# Patient Record
Sex: FEMALE | Race: WHITE | Employment: OTHER | URBAN - METROPOLITAN AREA
[De-identification: names, ages, dates, MRNs, and addresses within clinical notes are randomized per-mention and may not be internally consistent; named-entity substitution may affect disease eponyms.]

---

## 2017-12-03 ENCOUNTER — HOSPITAL ENCOUNTER (EMERGENCY)
Age: 82
Discharge: HOME | End: 2017-12-03
Attending: EMERGENCY MEDICINE
Payer: MEDICARE

## 2017-12-03 VITALS
SYSTOLIC BLOOD PRESSURE: 113 MMHG | WEIGHT: 102 LBS | BODY MASS INDEX: 20.03 KG/M2 | HEART RATE: 75 BPM | HEIGHT: 60 IN | RESPIRATION RATE: 19 BRPM | DIASTOLIC BLOOD PRESSURE: 73 MMHG | TEMPERATURE: 97.5 F | OXYGEN SATURATION: 95 %

## 2017-12-03 DIAGNOSIS — R11.0 NAUSEA WITHOUT VOMITING: ICD-10-CM

## 2017-12-03 DIAGNOSIS — R55 SYNCOPE AND COLLAPSE: Primary | ICD-10-CM

## 2017-12-03 LAB
ALBUMIN SERPL-MCNC: 3.1 G/DL (ref 3.5–5)
ALBUMIN/GLOB SERPL: 0.7 {RATIO} (ref 1.1–2.2)
ALP SERPL-CCNC: 129 U/L (ref 45–117)
ALT SERPL-CCNC: 33 U/L (ref 12–78)
ANION GAP SERPL CALC-SCNC: 9 MMOL/L (ref 5–15)
AST SERPL-CCNC: 41 U/L (ref 15–37)
ATRIAL RATE: 52 BPM
BASOPHILS # BLD: 0 K/UL (ref 0–0.1)
BASOPHILS NFR BLD: 0 % (ref 0–1)
BILIRUB SERPL-MCNC: 0.7 MG/DL (ref 0.2–1)
BUN SERPL-MCNC: 19 MG/DL (ref 6–20)
BUN/CREAT SERPL: 17 (ref 12–20)
CALCIUM SERPL-MCNC: 9.1 MG/DL (ref 8.5–10.1)
CALCULATED R AXIS, ECG10: -82 DEGREES
CALCULATED T AXIS, ECG11: 98 DEGREES
CHLORIDE SERPL-SCNC: 103 MMOL/L (ref 97–108)
CO2 SERPL-SCNC: 26 MMOL/L (ref 21–32)
CREAT SERPL-MCNC: 1.1 MG/DL (ref 0.55–1.02)
DIAGNOSIS, 93000: NORMAL
EOSINOPHIL # BLD: 0.2 K/UL (ref 0–0.4)
EOSINOPHIL NFR BLD: 2 % (ref 0–7)
ERYTHROCYTE [DISTWIDTH] IN BLOOD BY AUTOMATED COUNT: 13.2 % (ref 11.5–14.5)
GLOBULIN SER CALC-MCNC: 4.4 G/DL (ref 2–4)
GLUCOSE SERPL-MCNC: 132 MG/DL (ref 65–100)
HCT VFR BLD AUTO: 41.8 % (ref 35–47)
HGB BLD-MCNC: 13.5 G/DL (ref 11.5–16)
LYMPHOCYTES # BLD: 1.9 K/UL (ref 0.8–3.5)
LYMPHOCYTES NFR BLD: 18 % (ref 12–49)
MAGNESIUM SERPL-MCNC: 2.1 MG/DL (ref 1.6–2.4)
MCH RBC QN AUTO: 29.2 PG (ref 26–34)
MCHC RBC AUTO-ENTMCNC: 32.3 G/DL (ref 30–36.5)
MCV RBC AUTO: 90.3 FL (ref 80–99)
MONOCYTES # BLD: 1.2 K/UL (ref 0–1)
MONOCYTES NFR BLD: 11 % (ref 5–13)
NEUTS SEG # BLD: 7.4 K/UL (ref 1.8–8)
NEUTS SEG NFR BLD: 69 % (ref 32–75)
PLATELET # BLD AUTO: 228 K/UL (ref 150–400)
POTASSIUM SERPL-SCNC: 3.9 MMOL/L (ref 3.5–5.1)
PROT SERPL-MCNC: 7.5 G/DL (ref 6.4–8.2)
Q-T INTERVAL, ECG07: 498 MS
QRS DURATION, ECG06: 154 MS
QTC CALCULATION (BEZET), ECG08: 529 MS
RBC # BLD AUTO: 4.63 M/UL (ref 3.8–5.2)
SODIUM SERPL-SCNC: 138 MMOL/L (ref 136–145)
TROPONIN I SERPL-MCNC: <0.04 NG/ML
VENTRICULAR RATE, ECG03: 68 BPM
WBC # BLD AUTO: 10.7 K/UL (ref 3.6–11)

## 2017-12-03 PROCEDURE — 80053 COMPREHEN METABOLIC PANEL: CPT | Performed by: EMERGENCY MEDICINE

## 2017-12-03 PROCEDURE — 85025 COMPLETE CBC W/AUTO DIFF WBC: CPT | Performed by: EMERGENCY MEDICINE

## 2017-12-03 PROCEDURE — 96374 THER/PROPH/DIAG INJ IV PUSH: CPT

## 2017-12-03 PROCEDURE — 99285 EMERGENCY DEPT VISIT HI MDM: CPT

## 2017-12-03 PROCEDURE — 36415 COLL VENOUS BLD VENIPUNCTURE: CPT | Performed by: EMERGENCY MEDICINE

## 2017-12-03 PROCEDURE — 84484 ASSAY OF TROPONIN QUANT: CPT | Performed by: EMERGENCY MEDICINE

## 2017-12-03 PROCEDURE — 74011250636 HC RX REV CODE- 250/636: Performed by: EMERGENCY MEDICINE

## 2017-12-03 PROCEDURE — 93005 ELECTROCARDIOGRAM TRACING: CPT

## 2017-12-03 PROCEDURE — 83735 ASSAY OF MAGNESIUM: CPT | Performed by: EMERGENCY MEDICINE

## 2017-12-03 PROCEDURE — 74011250637 HC RX REV CODE- 250/637: Performed by: EMERGENCY MEDICINE

## 2017-12-03 RX ORDER — LOPERAMIDE HYDROCHLORIDE 2 MG/1
2 CAPSULE ORAL ONCE
Status: COMPLETED | OUTPATIENT
Start: 2017-12-03 | End: 2017-12-03

## 2017-12-03 RX ORDER — ONDANSETRON 2 MG/ML
4 INJECTION INTRAMUSCULAR; INTRAVENOUS
Status: COMPLETED | OUTPATIENT
Start: 2017-12-03 | End: 2017-12-03

## 2017-12-03 RX ADMIN — ONDANSETRON 4 MG: 2 INJECTION INTRAMUSCULAR; INTRAVENOUS at 11:39

## 2017-12-03 RX ADMIN — LOPERAMIDE HYDROCHLORIDE 2 MG: 2 CAPSULE ORAL at 13:10

## 2017-12-03 NOTE — ED NOTES
Patient IV removed and dressing applied. Discharge instructions provided and teaching completed. Family expresses understanding. Patient given clean clothing to go home in.

## 2017-12-03 NOTE — DISCHARGE INSTRUCTIONS
Fainting: Care Instructions  Your Care Instructions    When you faint, or pass out, you lose consciousness for a short time. A brief drop in blood flow to the brain often causes it. When you fall or lie down, more blood flows to your brain and you regain consciousness. Emotional stress, pain, or overheating-especially if you have been standing-can make you faint. In these cases, fainting is usually not serious. But fainting can be a sign of a more serious problem. Your doctor may want you to have more tests to rule out other causes. The treatment you need depends on the reason why you fainted. The doctor has checked you carefully, but problems can develop later. If you notice any problems or new symptoms, get medical treatment right away. Follow-up care is a key part of your treatment and safety. Be sure to make and go to all appointments, and call your doctor if you are having problems. It's also a good idea to know your test results and keep a list of the medicines you take. How can you care for yourself at home? · Drink plenty of fluids to prevent dehydration. If you have kidney, heart, or liver disease and have to limit fluids, talk with your doctor before you increase your fluid intake. When should you call for help? Call 911 anytime you think you may need emergency care. For example, call if:  ? · You have symptoms of a heart problem. These may include:  ¨ Chest pain or pressure. ¨ Severe trouble breathing. ¨ A fast or irregular heartbeat. ¨ Lightheadedness or sudden weakness. ¨ Coughing up pink, foamy mucus. ¨ Passing out. After you call 911, the  may tell you to chew 1 adult-strength or 2 to 4 low-dose aspirin. Wait for an ambulance. Do not try to drive yourself. ? · You have symptoms of a stroke. These may include:  ¨ Sudden numbness, tingling, weakness, or loss of movement in your face, arm, or leg, especially on only one side of your body. ¨ Sudden vision changes.   ¨ Sudden trouble speaking. ¨ Sudden confusion or trouble understanding simple statements. ¨ Sudden problems with walking or balance. ¨ A sudden, severe headache that is different from past headaches. ? · You passed out (lost consciousness) again. ? Watch closely for changes in your health, and be sure to contact your doctor if:  ? · You do not get better as expected. Where can you learn more? Go to http://kush-charlette.info/. Enter Z004 in the search box to learn more about \"Fainting: Care Instructions. \"  Current as of: March 20, 2017  Content Version: 11.4  © 1819-7174 [employer]. Care instructions adapted under license by [employer] (which disclaims liability or warranty for this information). If you have questions about a medical condition or this instruction, always ask your healthcare professional. [address] any warranty or liability for your use of this information. We hope that we have addressed all of your medical concerns. The examination and treatment you received in the Emergency Department were for an emergent problem and were not intended as complete care. It is important that you follow up with your healthcare provider(s) for ongoing care. If your symptoms worsen or do not improve as expected, and you are unable to reach your usual health care provider(s), you should return to the Emergency Department. Today's healthcare is undergoing tremendous change, and patient satisfaction surveys are one of the many tools to assess the quality of medical care. You may receive a survey from the [employer] organization regarding your experience in the Emergency Department. I hope that your experience has been completely positive, particularly the medical care that I provided. As such, please participate in the survey; anything less than excellent does not meet my expectations or intentions.         [facility] and Mariaelena Arreola participate in nationally recognized quality of care measures. If your blood pressure is greater than 120/80, as reported below, we urge that you seek medical care to address the potential of high blood pressure, commonly known as hypertension. Hypertension can be hereditary or can be caused by certain medical conditions, pain, stress, or \"white coat syndrome. \"       Please make an appointment with your health care provider(s) for follow up of your Emergency Department visit. VITALS:   Patient Vitals for the past 8 hrs:   Temp Pulse Resp BP SpO2   12/03/17 1215 - 67 17 121/64 93 %   12/03/17 1200 - 67 17 115/57 94 %   12/03/17 1145 - 67 20 131/60 94 %   12/03/17 1130 - 67 13 126/64 94 %   12/03/17 1115 - 66 17 125/70 95 %   12/03/17 1105 97.5 °F (36.4 °C) 66 16 122/57 94 %   12/03/17 1100 - 66 14 122/57 94 %          Thank you for allowing us to provide you with medical care today. We realize that you have many choices for your emergency care needs. Please choose us in the future for any continued health care needs.       David Parkinson MD    [facility], [facility].   Office: [phone]            Recent Results (from the past 24 hour(s))   EKG, 12 LEAD, INITIAL    Collection Time: 12/03/17 10:57 AM   Result Value Ref Range    Ventricular Rate 68 BPM    Atrial Rate 52 BPM    QRS Duration 154 ms    Q-T Interval 498 ms    QTC Calculation (Bezet) 529 ms    Calculated R Axis -82 degrees    Calculated T Axis 98 degrees    Diagnosis       Ventricular-paced rhythm with occasional premature ventricular complexes  No previous ECGs available     CBC WITH AUTOMATED DIFF    Collection Time: 12/03/17 11:12 AM   Result Value Ref Range    WBC 10.7 3.6 - 11.0 K/uL    RBC 4.63 3.80 - 5.20 M/uL    HGB 13.5 11.5 - 16.0 g/dL    HCT 41.8 35.0 - 47.0 %    MCV 90.3 80.0 - 99.0 FL    MCH 29.2 26.0 - 34.0 PG    MCHC 32.3 30.0 - 36.5 g/dL    RDW 13.2 11.5 - 14.5 %    PLATELET 243 302 - 257 K/uL    NEUTROPHILS 69 32 - 75 %    LYMPHOCYTES 18 12 - 49 %    MONOCYTES 11 5 - 13 %    EOSINOPHILS 2 0 - 7 %    BASOPHILS 0 0 - 1 %    ABS. NEUTROPHILS 7.4 1.8 - 8.0 K/UL    ABS. LYMPHOCYTES 1.9 0.8 - 3.5 K/UL    ABS. MONOCYTES 1.2 (H) 0.0 - 1.0 K/UL    ABS. EOSINOPHILS 0.2 0.0 - 0.4 K/UL    ABS. BASOPHILS 0.0 0.0 - 0.1 K/UL   METABOLIC PANEL, COMPREHENSIVE    Collection Time: 12/03/17 11:12 AM   Result Value Ref Range    Sodium 138 136 - 145 mmol/L    Potassium 3.9 3.5 - 5.1 mmol/L    Chloride 103 97 - 108 mmol/L    CO2 26 21 - 32 mmol/L    Anion gap 9 5 - 15 mmol/L    Glucose 132 (H) 65 - 100 mg/dL    BUN 19 6 - 20 MG/DL    Creatinine 1.10 (H) 0.55 - 1.02 MG/DL    BUN/Creatinine ratio 17 12 - 20      GFR est AA 56 (L) >60 ml/min/1.73m2    GFR est non-AA 46 (L) >60 ml/min/1.73m2    Calcium 9.1 8.5 - 10.1 MG/DL    Bilirubin, total 0.7 0.2 - 1.0 MG/DL    ALT (SGPT) 33 12 - 78 U/L    AST (SGOT) 41 (H) 15 - 37 U/L    Alk. phosphatase 129 (H) 45 - 117 U/L    Protein, total 7.5 6.4 - 8.2 g/dL    Albumin 3.1 (L) 3.5 - 5.0 g/dL    Globulin 4.4 (H) 2.0 - 4.0 g/dL    A-G Ratio 0.7 (L) 1.1 - 2.2     TROPONIN I    Collection Time: 12/03/17 11:12 AM   Result Value Ref Range    Troponin-I, Qt. <0.04 <0.05 ng/mL   MAGNESIUM    Collection Time: 12/03/17 11:12 AM   Result Value Ref Range    Magnesium 2.1 1.6 - 2.4 mg/dL       No results found.

## 2017-12-03 NOTE — ED PROVIDER NOTES
HPI Comments: 80 y.o. female with past medical history significant for dementia who presents from [facility] via EMS for evaluation s/p syncope. Per son, the pt was at [facility] when she stood up and felt as if she were going to faint so they sat her back down when she had a near syncopal episode and was \"out of it\" and not verbally responding but opening her eyes only. The son states that this has happened ~6 times in the past and just recently moved from [place] so was seen there in the past. The pt c/o some abd pain and nausea but the son states she is back to normal now. Son reports that she ate breakfast this morning. Pt denies any vomiting, diarrhea, fever, CP, or SOB. There are no other acute medical concerns at this time. Social hx: (-) tobacco use; (-) EtOH use  PCP: Tania Herrera MD  Cardiology: Dr Sindy Smith MD    Note written by Rajat Scott, as dictated by Franklin Torres MD 10:58 AM    The history is provided by the patient. No  was used. No past medical history on file. No past surgical history on file. No family history on file. Social History     Social History    Marital status: N/A     Spouse name: N/A    Number of children: N/A    Years of education: N/A     Occupational History    Not on file. Social History Main Topics    Smoking status: Not on file    Smokeless tobacco: Not on file    Alcohol use Not on file    Drug use: Not on file    Sexual activity: Not on file     Other Topics Concern    Not on file     Social History Narrative         ALLERGIES: Review of patient's allergies indicates not on file. Review of Systems   Constitutional: Negative for fever. HENT: Negative for facial swelling. Eyes: Negative for visual disturbance. Respiratory: Negative for chest tightness and shortness of breath. Cardiovascular: Negative for chest pain. Gastrointestinal: Positive for abdominal pain and nausea.  Negative for diarrhea and vomiting. Genitourinary: Negative for dysuria. Musculoskeletal: Negative for arthralgias. Skin: Negative for rash. Neurological: Negative for dizziness. Hematological: Negative for adenopathy. Psychiatric/Behavioral: Negative for suicidal ideas. All other systems reviewed and are negative. Vitals:    12/03/17 1105   BP: 122/57   Pulse: 66   Resp: 16   Temp: 97.5 °F (36.4 °C)   SpO2: 94%   Weight: 46.3 kg (102 lb)   Height: 5' (1.524 m)            Physical Exam   Constitutional: She is oriented to person, place, and time. She appears well-nourished. No distress. Frail and elderly appearing. HENT:   Head: Normocephalic and atraumatic. Mouth/Throat: Oropharynx is clear and moist.   Eyes: Pupils are equal, round, and reactive to light. No scleral icterus. Neck: Normal range of motion. Neck supple. No thyromegaly present. Cardiovascular: Normal rate, regular rhythm, normal heart sounds and intact distal pulses. No murmur heard. Pulmonary/Chest: Effort normal and breath sounds normal. No respiratory distress. Pacemaker in R-upper chest wall. Abdominal: Soft. Bowel sounds are normal. She exhibits no distension. There is no tenderness. Genitourinary:   Genitourinary Comments: Incontinent of stool. Musculoskeletal: Normal range of motion. She exhibits no edema. Neurological: She is alert and oriented to person, place, and time. Skin: Skin is warm and dry. No rash noted. She is not diaphoretic. Nursing note and vitals reviewed. Note written by Rajat Aceves, as dictated by Nancy Anaya MD 10:58 AM    MDM  Number of Diagnoses or Management Options  Diagnosis management comments: Syncope after rising from [facility] at [facility]. No full LOC. VS in ED normal.  Pt awake and alert. Complaints of feeling nauseated. No vomiting. No other complaints.   Exam unremarkable except pt was incontinent upon arrival.    P:  ecg  Labs  zofran  ivf  Po challenge    ED Course       Procedures    ED EKG interpretation:  Rhythm: v-paced. Rate (approx.): 68.  Axis: normal.  ST segment:  No concerning ST elevations or depressions. This EKG was interpreted by Ben Yates MD,ED Provider. Labs unremarkable. 12:23 PM  Patient resting comfortably with no complaints at this time. VS remain stable. Repeat physical exam is unremarkable. Pt ambulatory without difficulty and tolerating po's well.

## 2017-12-03 NOTE — ED TRIAGE NOTES
Triage note: pt arrived by EMS from [facility]. While she was there she had a near syncope episode. Pt arrived alert but confused. Pt incontinent of large amount of stool. Cleaned up and repositioned.

## 2018-02-09 ENCOUNTER — HOSPITAL ENCOUNTER (OUTPATIENT)
Dept: NON INVASIVE DIAGNOSTICS | Age: 83
Discharge: HOME | End: 2018-02-09
Attending: INTERNAL MEDICINE | Admitting: INTERNAL MEDICINE
Payer: MEDICARE

## 2018-02-09 VITALS
RESPIRATION RATE: 20 BRPM | SYSTOLIC BLOOD PRESSURE: 149 MMHG | BODY MASS INDEX: 21.6 KG/M2 | OXYGEN SATURATION: 97 % | DIASTOLIC BLOOD PRESSURE: 74 MMHG | HEIGHT: 60 IN | WEIGHT: 110 LBS | TEMPERATURE: 97.6 F | HEART RATE: 74 BPM

## 2018-02-09 LAB
ANION GAP SERPL CALC-SCNC: 6 MMOL/L (ref 5–15)
BASOPHILS # BLD: 0.1 K/UL (ref 0–0.1)
BASOPHILS NFR BLD: 1 % (ref 0–1)
BUN SERPL-MCNC: 18 MG/DL (ref 6–20)
BUN/CREAT SERPL: 19 (ref 12–20)
CALCIUM SERPL-MCNC: 9.1 MG/DL (ref 8.5–10.1)
CHLORIDE SERPL-SCNC: 105 MMOL/L (ref 97–108)
CO2 SERPL-SCNC: 29 MMOL/L (ref 21–32)
CREAT SERPL-MCNC: 0.97 MG/DL (ref 0.55–1.02)
DIFFERENTIAL METHOD BLD: NORMAL
EOSINOPHIL # BLD: 0.3 K/UL (ref 0–0.4)
EOSINOPHIL NFR BLD: 5 % (ref 0–7)
ERYTHROCYTE [DISTWIDTH] IN BLOOD BY AUTOMATED COUNT: 12.9 % (ref 11.5–14.5)
GLUCOSE SERPL-MCNC: 83 MG/DL (ref 65–100)
HCT VFR BLD AUTO: 43.6 % (ref 35–47)
HGB BLD-MCNC: 14.3 G/DL (ref 11.5–16)
IMM GRANULOCYTES # BLD: 0 K/UL (ref 0–0.04)
IMM GRANULOCYTES NFR BLD AUTO: 0 % (ref 0–0.5)
LYMPHOCYTES # BLD: 1.9 K/UL (ref 0.8–3.5)
LYMPHOCYTES NFR BLD: 28 % (ref 12–49)
MCH RBC QN AUTO: 30.1 PG (ref 26–34)
MCHC RBC AUTO-ENTMCNC: 32.8 G/DL (ref 30–36.5)
MCV RBC AUTO: 91.8 FL (ref 80–99)
MONOCYTES # BLD: 0.7 K/UL (ref 0–1)
MONOCYTES NFR BLD: 10 % (ref 5–13)
NEUTS SEG # BLD: 3.8 K/UL (ref 1.8–8)
NEUTS SEG NFR BLD: 56 % (ref 32–75)
NRBC # BLD: 0 K/UL (ref 0–0.01)
NRBC BLD-RTO: 0 PER 100 WBC
PLATELET # BLD AUTO: 233 K/UL (ref 150–400)
PMV BLD AUTO: 9.2 FL (ref 8.9–12.9)
POTASSIUM SERPL-SCNC: 3.9 MMOL/L (ref 3.5–5.1)
RBC # BLD AUTO: 4.75 M/UL (ref 3.8–5.2)
SODIUM SERPL-SCNC: 140 MMOL/L (ref 136–145)
WBC # BLD AUTO: 6.8 K/UL (ref 3.6–11)

## 2018-02-09 PROCEDURE — 77030002933 HC SUT MCRYL J&J -A

## 2018-02-09 PROCEDURE — 33228 REMV&REPLC PM GEN DUAL LEAD: CPT

## 2018-02-09 PROCEDURE — 74011000272 HC RX REV CODE- 272: Performed by: INTERNAL MEDICINE

## 2018-02-09 PROCEDURE — 77030031139 HC SUT VCRL2 J&J -A

## 2018-02-09 PROCEDURE — 36415 COLL VENOUS BLD VENIPUNCTURE: CPT | Performed by: INTERNAL MEDICINE

## 2018-02-09 PROCEDURE — 80048 BASIC METABOLIC PNL TOTAL CA: CPT | Performed by: INTERNAL MEDICINE

## 2018-02-09 PROCEDURE — 85025 COMPLETE CBC W/AUTO DIFF WBC: CPT | Performed by: INTERNAL MEDICINE

## 2018-02-09 PROCEDURE — 77030012935 HC DRSG AQUACEL BMS -B

## 2018-02-09 PROCEDURE — 74011250636 HC RX REV CODE- 250/636: Performed by: INTERNAL MEDICINE

## 2018-02-09 PROCEDURE — 74011000250 HC RX REV CODE- 250: Performed by: INTERNAL MEDICINE

## 2018-02-09 PROCEDURE — C1785 PMKR, DUAL, RATE-RESP: HCPCS

## 2018-02-09 PROCEDURE — 74011000258 HC RX REV CODE- 258: Performed by: INTERNAL MEDICINE

## 2018-02-09 PROCEDURE — 77030010507 HC ADH SKN DERMBND J&J -B

## 2018-02-09 RX ORDER — CEPHALEXIN 500 MG/1
500 CAPSULE ORAL 2 TIMES DAILY
Qty: 14 CAP | Refills: 0 | Status: SHIPPED | OUTPATIENT
Start: 2018-02-09 | End: 2018-02-16

## 2018-02-09 RX ORDER — SODIUM CHLORIDE 0.9 % (FLUSH) 0.9 %
5-10 SYRINGE (ML) INJECTION EVERY 8 HOURS
Status: DISCONTINUED | OUTPATIENT
Start: 2018-02-09 | End: 2018-02-09 | Stop reason: HOSPADM

## 2018-02-09 RX ORDER — CEFAZOLIN SODIUM/WATER 2 G/20 ML
2 SYRINGE (ML) INTRAVENOUS
Status: DISCONTINUED | OUTPATIENT
Start: 2018-02-09 | End: 2018-02-09

## 2018-02-09 RX ORDER — SODIUM CHLORIDE 9 MG/ML
25 INJECTION, SOLUTION INTRAVENOUS CONTINUOUS
Status: DISCONTINUED | OUTPATIENT
Start: 2018-02-09 | End: 2018-02-09 | Stop reason: HOSPADM

## 2018-02-09 RX ORDER — SODIUM CHLORIDE 9 MG/ML
500 INJECTION, SOLUTION INTRAVENOUS ONCE
Status: COMPLETED | OUTPATIENT
Start: 2018-02-09 | End: 2018-02-09

## 2018-02-09 RX ORDER — MIDAZOLAM HYDROCHLORIDE 1 MG/ML
.5-5 INJECTION, SOLUTION INTRAMUSCULAR; INTRAVENOUS
Status: DISCONTINUED | OUTPATIENT
Start: 2018-02-09 | End: 2018-02-09 | Stop reason: HOSPADM

## 2018-02-09 RX ORDER — ATROPINE SULFATE 0.1 MG/ML
1 INJECTION INTRAVENOUS AS NEEDED
Status: DISCONTINUED | OUTPATIENT
Start: 2018-02-09 | End: 2018-02-09 | Stop reason: HOSPADM

## 2018-02-09 RX ORDER — ASPIRIN 325 MG
81 TABLET ORAL DAILY
COMMUNITY

## 2018-02-09 RX ORDER — OMEPRAZOLE 20 MG/1
20 CAPSULE, DELAYED RELEASE ORAL DAILY
COMMUNITY
Start: 2021-01-01

## 2018-02-09 RX ORDER — LISINOPRIL 2.5 MG/1
2.5 TABLET ORAL DAILY
COMMUNITY
End: 2020-10-10

## 2018-02-09 RX ORDER — SODIUM CHLORIDE 0.9 % (FLUSH) 0.9 %
5-10 SYRINGE (ML) INJECTION AS NEEDED
Status: DISCONTINUED | OUTPATIENT
Start: 2018-02-09 | End: 2018-02-09 | Stop reason: HOSPADM

## 2018-02-09 RX ORDER — FENTANYL CITRATE 50 UG/ML
25-100 INJECTION, SOLUTION INTRAMUSCULAR; INTRAVENOUS
Status: DISCONTINUED | OUTPATIENT
Start: 2018-02-09 | End: 2018-02-09 | Stop reason: HOSPADM

## 2018-02-09 RX ORDER — DIGOXIN 125 MCG
0.12 TABLET ORAL DAILY
COMMUNITY
Start: 2021-01-01

## 2018-02-09 RX ORDER — ACETAMINOPHEN 325 MG/1
650 TABLET ORAL
Status: DISCONTINUED | OUTPATIENT
Start: 2018-02-09 | End: 2018-02-09 | Stop reason: HOSPADM

## 2018-02-09 RX ORDER — DONEPEZIL HYDROCHLORIDE 5 MG/1
10 TABLET, FILM COATED ORAL
COMMUNITY
Start: 2021-01-01

## 2018-02-09 RX ADMIN — MIDAZOLAM HYDROCHLORIDE 1 MG: 1 INJECTION, SOLUTION INTRAMUSCULAR; INTRAVENOUS at 09:37

## 2018-02-09 RX ADMIN — FENTANYL CITRATE 25 MCG: 50 INJECTION, SOLUTION INTRAMUSCULAR; INTRAVENOUS at 09:37

## 2018-02-09 RX ADMIN — FENTANYL CITRATE 25 MCG: 50 INJECTION, SOLUTION INTRAMUSCULAR; INTRAVENOUS at 10:01

## 2018-02-09 RX ADMIN — LIDOCAINE HYDROCHLORIDE 300 MG: 10; .005 INJECTION, SOLUTION EPIDURAL; INFILTRATION; INTRACAUDAL; PERINEURAL at 10:00

## 2018-02-09 RX ADMIN — CEFAZOLIN SODIUM 1 G: 1 INJECTION, POWDER, FOR SOLUTION INTRAMUSCULAR; INTRAVENOUS at 09:22

## 2018-02-09 RX ADMIN — Medication 10 ML: at 09:38

## 2018-02-09 RX ADMIN — SODIUM CHLORIDE: 900 IRRIGANT IRRIGATION at 10:09

## 2018-02-09 RX ADMIN — MIDAZOLAM HYDROCHLORIDE 1 MG: 1 INJECTION, SOLUTION INTRAMUSCULAR; INTRAVENOUS at 09:59

## 2018-02-09 RX ADMIN — SODIUM CHLORIDE 25 ML/HR: 900 INJECTION, SOLUTION INTRAVENOUS at 08:31

## 2018-02-09 RX ADMIN — SODIUM CHLORIDE 250 ML: 900 INJECTION, SOLUTION INTRAVENOUS at 09:38

## 2018-02-09 NOTE — ACP (ADVANCE CARE PLANNING)
Notified by [facility] personnel that Mrs Brant Chaparro was interested in an AMD. Met with patient, her son, & daughter-in-law in [facility] Lab waiting area. Mrs Brant Chaparro was smiling and appeared relaxed. Daughter-in-law, Neli Gray, shared that patient had completed an AMD while in [place] but had been unable to locate the document and would like to complete a new one. Patient's son, who was present, stated that he was one of four siblings. Provided an overview of an AMD with brief explanations about the purpose of each section. Before conversation could be completed, Mrs Brant Chaparro was summoned by Cath personnel for her procedure. Family stated they would take the AMD information and review it for possible completion at another time when Mrs Brant Chaparro could review it more closely. Provided them with a blank AMD and a copy of the booklet, \"Your Right to Decide. \"    Encouraged patient and family to notify [id number] [facility] if/when they would like further assistance in getting the AMD completed. : Rev. Paris Perez.  Juan Carlso Mark; [facility], to contact [address] [facility] call: [phone]

## 2018-02-09 NOTE — IP AVS SNAPSHOT
[address] [place] [address] 
[phone] Patient: Jackie Garza MRN: [id number] HWT:88/88/1358 A check billy indicates which time of day the medication should be taken. My Medications START taking these medications Instructions Each Dose to Equal  
 Morning Noon Evening Bedtime  
 cephALEXin 500 mg capsule Commonly known as:  Lugene Marvel Your last dose was: Your next dose is: Take 1 Cap by mouth two (2) times a day for 7 days. 500 mg CONTINUE taking these medications Instructions Each Dose to Equal  
 Morning Noon Evening Bedtime  
 aspirin 325 mg tablet Commonly known as:  ASPIRIN Your last dose was: Your next dose is: Take 325 mg by mouth daily. 325 mg  
    
   
   
   
  
 digoxin 0.125 mg tablet Commonly known as:  LANOXIN Your last dose was: Your next dose is: Take 0.125 mg by mouth daily. 0.125 mg  
    
   
   
   
  
 donepezil 5 mg tablet Commonly known as:  ARICEPT Your last dose was: Your next dose is: Take 5 mg by mouth nightly. 5 mg  
    
   
   
   
  
 I-CAMERON tablet Generic drug:  vit a,c & e-lutein-minerals Your last dose was: Your next dose is:    
   
   
 1 Tab daily. 1 Tab  
    
   
   
   
  
 lisinopril 2.5 mg tablet Commonly known as:  Loralie Mars Your last dose was: Your next dose is: Take 2.5 mg by mouth daily. 2.5 mg  
    
   
   
   
  
 omeprazole 20 mg capsule Commonly known as:  PRILOSEC Your last dose was: Your next dose is: Take 20 mg by mouth daily. 20 mg Where to Get Your Medications Information on where to get these meds will be given to you by the nurse or doctor. ! Ask your nurse or doctor about these medications cephALEXin 500 mg capsule

## 2018-02-09 NOTE — PROCEDURES
Pacemaker Procedure Note  Minus Jessica  [id number]      Date of Procedure: 2/9/2018    Physician: Chaya Vazquez MD    Referring Physician: Dr. 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 Procedure: dual chamber pacemaker generator replacement   The patient received prophylactic antibiotics in route to the laboratory. Once there, She was prepped and draped in the usual sterile fashion. The entry site was anesthetized with 1% lidocaine locally. A 4cm incision was then made parallel to the right clavicle. Utilizing blunt and dissection, the chronic generator was explanted and chronic leads were interrogated and found to be in good condition. They were used to connect to new generator. The gauzes were removed from the pocket. The pocket was irrigated with copious amounts of antibiotic solution. The leads were connected to the generator and the generator placed within the pocket. The pocket was closed with a running suture of 3.0 Vicryl. The skin was closed with 4.0 Vicryl. Dermabond and Aquaseal dressing were applied. The patient was returned to the cardiac care unit in stable condition. Complications: None    Implant Data: The patient received a Medtronin generator, Model# [id number], serial number [id number]. The atrial lead was a chronic lead, serial number [id number]. The P wave sensing at 3.6 mV, thershold 1.5V at 0.4 ms with impedance of 437 ohms. The ventricular lead was a chronic lead, serial number [id number]. The R wave sensing at 9.8 mV, threshold 0.5 V at 0.4 ms with impedance of 494 ohms.       EBL; 10 cc  Specimen removed; none other than explanted chronic generator  Complication; none    Plan: usual post pacer care      Signed By: Chaya Vazquez MD

## 2018-02-09 NOTE — IP AVS SNAPSHOT
[address] [facility] [address] [place] [address] 
[phone] Patient: Neptali Mejia MRN: [id number] WILLIAN:[birth year] About your hospitalization You were admitted on:  February 9, 2018 You last received care in the:  30 [facility] You were discharged on:  February 9, 2018 Why you were hospitalized Your primary diagnosis was:  Not on File Follow-up Information Follow up With Details Comments Contact Info Nemesio Calvert MD   [address] [facility] [address] [facility] [address] 
[phone] Gabriel Lemos MD Schedule an appointment as soon as possible for a visit in 1 week [facility] [address] [address] [place] [address] 
[phone] Discharge Orders None A check billy indicates which time of day the medication should be taken. My Medications START taking these medications Instructions Each Dose to Equal  
 Morning Noon Evening Bedtime  
 cephALEXin 500 mg capsule Commonly known as:  Jamia Grief Your last dose was: Your next dose is: Take 1 Cap by mouth two (2) times a day for 7 days. 500 mg CONTINUE taking these medications Instructions Each Dose to Equal  
 Morning Noon Evening Bedtime  
 aspirin 325 mg tablet Commonly known as:  ASPIRIN Your last dose was: Your next dose is: Take 325 mg by mouth daily. 325 mg  
    
   
   
   
  
 digoxin 0.125 mg tablet Commonly known as:  LANOXIN Your last dose was: Your next dose is: Take 0.125 mg by mouth daily. 0.125 mg  
    
   
   
   
  
 donepezil 5 mg tablet Commonly known as:  ARICEPT Your last dose was: Your next dose is: Take 5 mg by mouth nightly. 5 mg  
    
   
   
   
  
 I-CAMERON tablet Generic drug:  vit a,c & e-lutein-minerals Your last dose was: Your next dose is: 1 Tab daily. 1 Tab  
    
   
   
   
  
 lisinopril 2.5 mg tablet Commonly known as:  Prema Bolden Your last dose was: Your next dose is: Take 2.5 mg by mouth daily. 2.5 mg  
    
   
   
   
  
 omeprazole 20 mg capsule Commonly known as:  PRILOSEC Your last dose was: Your next dose is: Take 20 mg by mouth daily. 20 mg Where to Get Your Medications Information on where to get these meds will be given to you by the nurse or doctor. ! Ask your nurse or doctor about these medications  
  cephALEXin 500 mg capsule Discharge Instructions PATIENT INSTRUCTIONS POST-PACEMAKER IMPLANT 1. No heavy lifting or exercises with the left arm for 1 week. This includes the following: Do not raise arm above the shoulder level to comb hair, pull on clothes, etc... Do not use the affected arm to pull up or push up from a seated or laying 
down position. Do not allow anyone else to pull on the affected arm. 2.  Do not shower for 7 days after discharge from the hospital. 
Sponge baths are O.K., provided the pacemaker site is kept dry and the 
affected arm is not raised and movement is limited. 3.  Please do not remove steri-strips. 4.  Do not drive for 1 week. 5.  Call Dr. Liz Snell [phone] if you experience any of the following symptoms: 1. Redness at the pacemaker site 2. Swelling at or around the pacemaker or in the left arm 3. Pain around the pacemaker 4. Dizziness, lightheadedness, fainting spells 5. Lack of energy 6. Shortness of breath 7. Rapid heart rate 8. Chest or muscle twitches 6. Follow-up with Dr. Liz Snell  in 7 days. Medications to take at home: keflex 500 mg tablet; take one tablet twice a day for one week DATE: __________  Physician: ______________________________ Introducing [facility] SERVICES! Anuja Bernardo introduces [employer] patient portal. Now you can access parts of your medical record, email your doctor's office, and request medication refills online. 1. In your internet browser, go to [email or web address] 2. Click on the First Time User? Click Here link in the Sign In box. You will see the New Member Sign Up page. 3. Enter your [employer] Access Code exactly as it appears below. You will not need to use this code after youve completed the sign-up process. If you do not sign up before the expiration date, you must request a new code. · [employer] Access Code: [email or web address] Expires: 3/3/2018 12:27 PM 
 
4. Enter the last four digits of your Social Security Number (xxxx) and Date of Birth (mm/dd/yyyy) as indicated and click Submit. You will be taken to the next sign-up page. 5. Create a [employer] ID. This will be your [employer] login ID and cannot be changed, so think of one that is secure and easy to remember. 6. Create a [employer] password. You can change your password at any time. 7. Enter your Password Reset Question and Answer. This can be used at a later time if you forget your password. 8. Enter your e-mail address. You will receive e-mail notification when new information is available in [address] 9. Click Sign Up. You can now view and download portions of your medical record. 10. Click the Download Summary menu link to download a portable copy of your medical information. If you have questions, please visit the Frequently Asked Questions section of the [employer] website. Remember, [employer] is NOT to be used for urgent needs. For medical emergencies, dial 911. Now available from your iPhone and Android! Providers Seen During Your Hospitalization Provider Specialty Primary office phone Alena Arias MD Cardiology [phone] Your Primary Care Physician (PCP) Primary Care Physician Office Phone Office Fax Catalina Moncada [phone] You are allergic to the following No active allergies Recent Documentation Height Weight Breastfeeding? BMI OB Status Smoking Status 1.524 m 49.9 kg No 21.48 kg/m2 Menopause Never Smoker Emergency Contacts Name Discharge Info Relation Home Work Mobile Roger Chávez DISCHARGE CAREGIVER [3] Child [2]   [phone] Patient Belongings The following personal items are in your possession at time of discharge: 
     Visual Aid: Glasses Please provide this summary of care documentation to your next provider. Signatures-by signing, you are acknowledging that this After Visit Summary has been reviewed with you and you have received a copy. Patient Signature:  ____________________________________________________________ Date:  ____________________________________________________________  
  
Gisella Keto Provider Signature:  ____________________________________________________________ Date:  ____________________________________________________________

## 2018-02-09 NOTE — PROGRESS NOTES
Spiritual Care Assessment/Progress Notes    Femi Garcia [id number]  [id number]    12/10/1920  80 y.o.  female    Patient Telephone Number: [phone] (home)   Lutheran Affiliation: No preference   Language: English   Extended Emergency Contact Information  Primary Emergency Contact: [address]  Mobile Phone: [phone]  Relation: Child   There are no active problems to display for this patient. Date: 2/9/2018       Level of Lutheran/Spiritual Activity:  []         Involved in ty tradition/spiritual practice    []         Not involved in ty tradition/spiritual practice  []         Spiritually oriented    []         Claims no spiritual orientation    []         seeking spiritual identity  []         Feels alienated from Adventism practice/tradition  []         Feels angry about Adventism practice/tradition  [x]         Spirituality/Adventism tradition IS a resource for coping at this time.   []         Not able to assess due to medical condition    Services Provided Today:  []         crisis intervention    []         reading Scriptures  [x]         spiritual assessment    []         prayer  [x]         empathic listening/emotional support  []         rites and rituals (cite in comments)  []         life review     []         Adventism support  []         theological development   []         advocacy  []         ethical dialog     []         blessing  []         bereavement support    [x]         support to family  []         anticipatory grief support   [x]         help with AMD  []         spiritual guidance    []         meditation      Spiritual Care Needs  [x]         Emotional Support  []         Spiritual/Lutheran Care  []         Loss/Adjustment  []         Advocacy/Referral                /Ethics  []         No needs expressed at               this time  [x]         Other: (note in               comments)  [address]  []         Follow up visits with               pt/family  []         Provide materials  []         Schedule sacraments  []         Contact Community               Clergy  [x]         Follow up as needed  []         Other: (note in               comments)     Comments: Notified by Cath Lab personnel that Mrs Donald Graham was interested in an AMD. Met with patient, her son, & daughter-in-law in Cath Lab waiting area. Mrs Donald Graham was smiling and appeared relaxed. Daughter-in-law, Vinnie Soria, shared that patient had completed an AMD while in [place] but had been unable to locate the document and would like to complete a new one. Patient's son, who was present, stated that he was one of four siblings. Provided an overview of an AMD with brief explanations about the purpose of each section. Before conversation could be completed, Mrs Donald Graham was summoned by Cath personnel for her procedure. Family stated they would take the AMD information and review it for possible completion at another time when Mrs Donald Graham could review it more closely. Provided them with a blank AMD and a copy of the booklet, \"Your Right to Decide. \"    Encouraged patient and family to notify [phone] if/when they would like further assistance in getting the AMD completed. Assured patient, that with her permission,  would keep her in prayer. : Rev. Daryle Dory.  Leighton Winslow; [facility], to contact [phone] [address] call: [phone]

## 2018-02-09 NOTE — PROGRESS NOTES
TRANSFER - IN REPORT:    Verbal report received from Arlyn Malave on Emily Carlos, Procedure Generator Change , from the Cardiac Cath lab, for routine progression of care. Report consisted of patients Situation, Background, Assessment and Recommendations(SBAR). Information from the following report(s) Procedure Summary, MAR and Recent Results was reviewed with the receiving clinician. Opportunity for questions and clarification was provided. Assessment completed upon patients arrival to [address] [place] [address] and care assumed. Cardiac Cath Lab Recovery Arrival Note:     Emily Carlos arrived to [facility] recovery area. Patient procedure= Generator Change. Patient on cardiac monitor, non-invasive blood pressure, Patient status doing well without problems. Patient is Arousable. Patient reports no pain, no chest pain, no n/vb. Procedure site without any bleeding and no hematoma.

## 2018-02-09 NOTE — DISCHARGE INSTRUCTIONS
PATIENT INSTRUCTIONS POST-PACEMAKER IMPLANT    1. No heavy lifting or exercises with the left arm for 1 week. This includes the following:  Do not raise arm above the shoulder level to comb hair, pull on clothes, etc... Do not use the affected arm to pull up or push up from a seated or laying  down position. Do not allow anyone else to pull on the affected arm. 2.  Do not shower for 7 days after discharge from the hospital.  Sponge baths are O.K., provided the pacemaker site is kept dry and the  affected arm is not raised and movement is limited. 3.  Please do not remove steri-strips. 4.  Do not drive for 1 week. 5.  Call Dr. Dhruv Marrero [phone] if you experience any of the following symptoms:  1. Redness at the pacemaker site  2. Swelling at or around the pacemaker or in the left arm  3. Pain around the pacemaker  4. Dizziness, lightheadedness, fainting spells  5. Lack of energy  6. Shortness of breath  7. Rapid heart rate  8. Chest or muscle twitches    6. Follow-up with Dr. Dhruv Marrero  in 7 days.         Medications to take at home: keflex 500 mg tablet; take one tablet twice a day for one week    DATE: __________  Physician: ______________________________

## 2018-02-09 NOTE — PROGRESS NOTES
Cardiac Cath Lab Procedure Area Arrival Note:    Dalila Bumpers arrived to Cardiac Cath Lab, Procedure Area. Patient identifiers verified with NAME and DATE OF BIRTH. Procedure verified with patient. Consent forms verified. Allergies verified. Patient informed of procedure and plan of care. Questions answered with review. Patient voiced understanding of procedure and plan of care. Patient on cardiac monitor, non-invasive blood pressure, SPO2 monitor. On Room air and placed on O2 @ 2 lpm via NC.  IV of Normal Saline on pump at 100 ml/hr. Patient status doing well without problems. Patient is A&Ox 4. Patient reports no pain. Patient medicated during procedure with orders obtained and verified by Dr. Pat Ferrera. Refer to patients Cardiac Cath Lab PROCEDURE REPORT for vital signs, assessment, status, and response during procedure, printed at end of case. Printed report on chart or scanned into chart.

## 2018-02-09 NOTE — ROUTINE PROCESS
Cardiac Cath Lab Recovery Arrival Note:      Neptali Mejia arrived to Cardiac Cath Lab, Recovery Area. Staff introduced to patient. Patient identifiers verified with NAME and DATE OF BIRTH. Procedure verified with patient. Consent forms reviewed and signed by patient or authorized representative and verified. Allergies verified. Patient and family oriented to department. Patient and family informed of procedure and plan of care. Questions answered with review. Patient prepped for procedure, per orders from physician, prior to arrival.    Patient on cardiac monitor, non-invasive blood pressure, SPO2 monitor. On RA. Patient is A&Ox 4. Patient reports no pain. Patient in stretcher, in low position, with side rails up, call bell within reach, patient instructed to call if assistance as needed. Patient prep in: [address], [place] [address]. Patient family has pager #   Family in: . Prep by: DESMOND Baugh RN

## 2020-02-23 ENCOUNTER — APPOINTMENT (OUTPATIENT)
Dept: CT IMAGING | Age: 85
End: 2020-02-23
Attending: EMERGENCY MEDICINE
Payer: MEDICARE

## 2020-02-23 ENCOUNTER — HOSPITAL ENCOUNTER (EMERGENCY)
Age: 85
Discharge: HOME | End: 2020-02-23
Attending: EMERGENCY MEDICINE
Payer: MEDICARE

## 2020-02-23 VITALS
HEART RATE: 76 BPM | SYSTOLIC BLOOD PRESSURE: 140 MMHG | WEIGHT: 94.8 LBS | TEMPERATURE: 99.4 F | RESPIRATION RATE: 20 BRPM | BODY MASS INDEX: 18.51 KG/M2 | DIASTOLIC BLOOD PRESSURE: 69 MMHG | OXYGEN SATURATION: 96 %

## 2020-02-23 DIAGNOSIS — S01.01XA LACERATION OF SCALP, INITIAL ENCOUNTER: Primary | ICD-10-CM
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
## 2020-02-23 PROCEDURE — 74011000250 HC RX REV CODE- 250: Performed by: EMERGENCY MEDICINE

## 2020-02-23 PROCEDURE — 75810000293 HC SIMP/SUPERF WND  RPR

## 2020-02-23 PROCEDURE — 99284 EMERGENCY DEPT VISIT MOD MDM: CPT

## 2020-02-23 PROCEDURE — 70450 CT HEAD/BRAIN W/O DYE: CPT

## 2020-02-23 RX ORDER — BACITRACIN 500 UNIT/G
1 PACKET (EA) TOPICAL
Status: COMPLETED | OUTPATIENT
Start: 2020-02-23 | End: 2020-02-23

## 2020-02-23 RX ADMIN — Medication 2 ML: at 20:31

## 2020-02-23 RX ADMIN — BACITRACIN 1 PACKET: 500 OINTMENT TOPICAL at 21:56

## 2020-02-24 NOTE — ED PROVIDER NOTES
Supa Hodges is a 81 yo F with laceration to her scalp after a fall. She is a resident at [facility] and staff reported that she tripped and fell, hitting her head on another resident's walker. She did not pass out. Her family brought her to the ED for evaluation. Family state that staff reported that patient had been complaining of left hip pain but patient denies pain and ambulated to a from their car without difficulty. Her last tetanus booster was 3 years ago. Past Medical History:  
Diagnosis Date  Dementia ([facility] [address] Past Surgical History:  
Procedure Laterality Date  HX HEENT    
 HX PACEMAKER History reviewed. No pertinent family history. Social History Socioeconomic History  Marital status: SINGLE Spouse name: Not on file  Number of children: Not on file  Years of education: Not on file  Highest education level: Not on file Occupational History  Not on file Social Needs  Financial resource strain: Not on file  Food insecurity:  
  Worry: Not on file Inability: Not on file  Transportation needs:  
  Medical: Not on file Non-medical: Not on file Tobacco Use  Smoking status: Never Smoker  Smokeless tobacco: Never Used Substance and Sexual Activity  Alcohol use: No  
 Drug use: No  
 Sexual activity: Not on file Lifestyle  Physical activity:  
  Days per week: Not on file Minutes per session: Not on file  Stress: Not on file Relationships  Social connections:  
  Talks on phone: Not on file Gets together: Not on file Attends Buddhism service: Not on file Active member of club or organization: Not on file Attends meetings of clubs or organizations: Not on file Relationship status: Not on file  Intimate partner violence:  
  Fear of current or ex partner: Not on file Emotionally abused: Not on file Physically abused: Not on file Forced sexual activity: Not on file Other Topics Concern  Not on file Social History Narrative  Not on file ALLERGIES: Patient has no known allergies. Review of Systems Constitutional: Negative for fever. HENT: Negative for sore throat. Eyes: Negative for visual disturbance. Respiratory: Negative for cough. Cardiovascular: Negative for chest pain. Gastrointestinal: Negative for abdominal pain. Genitourinary: Negative for dysuria. Musculoskeletal: Negative for back pain. Skin: Positive for wound. Negative for rash. Neurological: Negative for headaches. Vitals:  
 02/23/20 1955 BP: 140/69 Pulse: 76 Resp: 20 Temp: 99.4 °F (37.4 °C) SpO2: 96% Weight: 43 kg (94 lb 12.8 oz) Physical Exam 
Vitals signs and nursing note reviewed. Constitutional:   
   General: She is not in acute distress. Appearance: She is well-developed. HENT:  
   Head: Normocephalic. Laceration (3cm, posterior scalp) present. Eyes:  
   Conjunctiva/sclera: Conjunctivae normal.  
Neck: Musculoskeletal: Normal range of motion and neck supple. Trachea: Phonation normal.  
Cardiovascular:  
   Rate and Rhythm: Normal rate. Pulmonary:  
   Effort: Pulmonary effort is normal. No respiratory distress. Chest:  
   Chest wall: No tenderness. Abdominal:  
   General: There is no distension. Tenderness: There is no abdominal tenderness. Musculoskeletal: Normal range of motion. General: No tenderness. Right hip: She exhibits normal range of motion and no tenderness. Left hip: She exhibits normal range of motion and no tenderness. Skin: 
   General: Skin is warm and dry. Neurological:  
   Mental Status: She is alert. She is not disoriented. Motor: No abnormal muscle tone. [facility] Wound Repair 
Date/Time: 2/23/2020 9:33 PM 
Location details: scalp Wound length: 3cm.  
 
Anesthesia: 
 Local Anesthetic: LET (lido,epi,tetracaine) Anesthetic total: 2 mL Foreign body present: white brittle yonatan, similar to dried paint. Irrigation solution: saline Irrigation method: syringe Debridement: minimal 
Skin closure: staples Number of sutures: 7 Dressing: antibiotic ointment Patient tolerance: Patient tolerated the procedure well with no immediate complications My total time at bedside, performing this procedure was 16-30 minutes.

## 2020-02-24 NOTE — DISCHARGE INSTRUCTIONS
Patient Education        Preventing Falls: Care Instructions  Your Care Instructions    Getting around your home safely can be a challenge if you have injuries or health problems that make it easy for you to fall. Loose rugs and furniture in walkways are among the dangers for many older people who have problems walking or who have poor eyesight. People who have conditions such as arthritis, osteoporosis, or dementia also have to be careful not to fall. You can make your home safer with a few simple measures. Follow-up care is a key part of your treatment and safety. Be sure to make and go to all appointments, and call your doctor if you are having problems. It's also a good idea to know your test results and keep a list of the medicines you take. How can you care for yourself at home? Taking care of yourself  · You may get dizzy if you do not drink enough water. To prevent dehydration, drink plenty of fluids, enough so that your urine is light yellow or clear like water. Choose water and other caffeine-free clear liquids. If you have kidney, heart, or liver disease and have to limit fluids, talk with your doctor before you increase the amount of fluids you drink. · Exercise regularly to improve your strength, muscle tone, and balance. Walk if you can. Swimming may be a good choice if you cannot walk easily. · Have your vision and hearing checked each year or any time you notice a change. If you have trouble seeing and hearing, you might not be able to avoid objects and could lose your balance. · Know the side effects of the medicines you take. Ask your doctor or pharmacist whether the medicines you take can affect your balance. Sleeping pills or sedatives can affect your balance. · Limit the amount of alcohol you drink. Alcohol can impair your balance and other senses. · Ask your doctor whether calluses or corns on your feet need to be removed.  If you wear loose-fitting shoes because of calluses or corns, you can lose your balance and fall. · Talk to your doctor if you have numbness in your feet. Preventing falls at home  · Remove raised doorway thresholds, throw rugs, and clutter. Repair loose carpet or raised areas in the floor. · Move furniture and electrical cords to keep them out of walking paths. · Use nonskid floor wax, and wipe up spills right away, especially on ceramic tile floors. · If you use a walker or cane, put rubber tips on it. If you use crutches, clean the bottoms of them regularly with an abrasive pad, such as steel wool. · Keep your house well lit, especially [address], and outside walkways. Use night-lights in areas such as hallways and bathrooms. Add extra light switches or use remote switches (such as switches that go on or off when you clap your hands) to make it easier to turn lights on if you have to get up during the night. · Install sturdy handrails on stairways. · Move items in your cabinets so that the things you use a lot are on the lower shelves (about waist level). · Keep a cordless phone and a flashlight with new batteries by your bed. If possible, put a phone in each of the main rooms of your house, or carry a cell phone in case you fall and cannot reach a phone. Or, you can wear a device around your neck or wrist. You push a button that sends a signal for help. · Wear low-heeled shoes that fit well and give your feet good support. Use footwear with nonskid soles. Check the heels and soles of your shoes for wear. Repair or replace worn heels or soles. · Do not wear socks without shoes on wood floors. · Walk on the grass when the sidewalks are slippery. If you live in an area that gets snow and ice in the winter, sprinkle salt on slippery steps and sidewalks. Preventing falls in the bath  · Install grab bars and nonskid mats inside and outside your shower or tub and near the toilet and sinks. · Use shower chairs and bath benches.   · Use a hand-held shower head that will allow you to sit while showering. · Get into a tub or shower by putting the weaker leg in first. Get out of a tub or shower with your strong side first.  · Repair loose toilet seats and consider installing a raised toilet seat to make getting on and off the toilet easier. · Keep your bathroom door unlocked while you are in the shower. Where can you learn more? Go to http://kush-charlette.info/. Enter 0476 [id number] in the search box to learn more about \"Preventing Falls: Care Instructions. \"  Current as of: March 16, 2018  Content Version: 11.8  © 9328-4393 [employer]. Care instructions adapted under license by [employer] (which disclaims liability or warranty for this information). If you have questions about a medical condition or this instruction, always ask your healthcare professional. [place] [address] any warranty or liability for your use of this information. Patient Education        Cuts Closed With Staples: Care Instructions  Your Care Instructions  A cut can happen anywhere on your body. The doctor used staples to close the cut. Staples easily and quickly close a cut, which helps the cut heal.  Sometimes a cut can injure tendons, blood vessels, or nerves. If the cut went deep and through the skin, the doctor may have put in a layer of stitches below the staples. The deeper layer of stitches brings the deep part of the cut together. These stitches will dissolve and don't need to be removed. The staples in the upper layer are what you see on the cut. You may have a bandage. You will need to have the staples removed, usually in 7 to 14 days. The doctor has checked you carefully, but problems can develop later. If you notice any problems or new symptoms, get medical treatment right away. Follow-up care is a key part of your treatment and safety.  Be sure to make and go to all appointments, and call your doctor if you are having problems. It's also a good idea to know your test results and keep a list of the medicines you take. How can you care for yourself at home? · Keep the cut dry for the first 24 to 48 hours. After this, you can shower if your doctor okays it. Pat the cut dry. · Don't soak the cut, such as in a bathtub. Your doctor will tell you when it's safe to get the cut wet. · If your doctor told you how to care for your cut, follow your doctor's instructions. If you did not get instructions, follow this general advice:  ? After the first 24 to 48 hours, wash around the cut with clean water 2 times a day. Don't use hydrogen peroxide or alcohol, which can slow healing. ? You may cover the cut with a thin layer of petroleum jelly, such as Vaseline, and a nonstick bandage. ? Apply more petroleum jelly and replace the bandage as needed. · Avoid any activity that could cause your cut to reopen. · Do not remove the staples on your own. Your doctor will tell you when to come back to have the staples removed. · Take pain medicines exactly as directed. ? If the doctor gave you a prescription medicine for pain, take it as prescribed. ? If you are not taking a prescription pain medicine, ask your doctor if you can take an over-the-counter medicine. When should you call for help? Call your doctor now or seek immediate medical care if:    · You have new pain, or your pain gets worse.     · The skin near the cut is cold or pale or changes color.     · You have tingling, weakness, or numbness near the cut.     · The cut starts to bleed, and blood soaks through the bandage. Oozing small amounts of blood is normal.     · You have trouble moving the area near the cut.     · You have symptoms of infection, such as:  ? Increased pain, swelling, warmth, or redness around the cut.  ?  Red streaks leading from the cut.  ? Pus draining from the cut.  ? A fever.    Watch closely for changes in your health, and be sure to contact your doctor if:    · You do not get better as expected. Where can you learn more? Go to http://kush-charlette.info/. Enter H669 in the search box to learn more about \"Cuts Closed With Staples: Care Instructions. \"  Current as of: June 26, 2019  Content Version: 12.2  © 5909-6137 [employer]. Care instructions adapted under license by [employer] (which disclaims liability or warranty for this information). If you have questions about a medical condition or this instruction, always ask your healthcare professional. [address] any warranty or liability for your use of this information.

## 2020-10-10 ENCOUNTER — HOSPITAL ENCOUNTER (EMERGENCY)
Age: 85
Discharge: HOME | End: 2020-10-10
Attending: EMERGENCY MEDICINE
Payer: MEDICARE

## 2020-10-10 ENCOUNTER — APPOINTMENT (OUTPATIENT)
Dept: CT IMAGING | Age: 85
End: 2020-10-10
Attending: EMERGENCY MEDICINE
Payer: MEDICARE

## 2020-10-10 VITALS
WEIGHT: 95.9 LBS | RESPIRATION RATE: 16 BRPM | TEMPERATURE: 98.5 F | BODY MASS INDEX: 18.73 KG/M2 | OXYGEN SATURATION: 95 % | DIASTOLIC BLOOD PRESSURE: 92 MMHG | HEART RATE: 65 BPM | SYSTOLIC BLOOD PRESSURE: 134 MMHG

## 2020-10-10 DIAGNOSIS — S01.01XA LACERATION OF SCALP, INITIAL ENCOUNTER: ICD-10-CM

## 2020-10-10 DIAGNOSIS — S09.90XA CLOSED HEAD INJURY, INITIAL ENCOUNTER: Primary | ICD-10-CM

## 2020-10-10 LAB
APPEARANCE UR: CLEAR
BACTERIA URNS QL MICRO: NEGATIVE /HPF
BILIRUB UR QL: NEGATIVE
COLOR UR: ABNORMAL
EPITH CASTS URNS QL MICRO: ABNORMAL /LPF
GLUCOSE UR STRIP.AUTO-MCNC: NEGATIVE MG/DL
HGB UR QL STRIP: NEGATIVE
KETONES UR QL STRIP.AUTO: NEGATIVE MG/DL
LEUKOCYTE ESTERASE UR QL STRIP.AUTO: NEGATIVE
NITRITE UR QL STRIP.AUTO: NEGATIVE
PH UR STRIP: 5.5 [PH] (ref 5–8)
PROT UR STRIP-MCNC: ABNORMAL MG/DL
RBC #/AREA URNS HPF: ABNORMAL /HPF (ref 0–5)
SP GR UR REFRACTOMETRY: 1.03 (ref 1–1.03)
UROBILINOGEN UR QL STRIP.AUTO: 0.2 EU/DL (ref 0.2–1)
WBC URNS QL MICRO: ABNORMAL /HPF (ref 0–4)

## 2020-10-10 PROCEDURE — 70450 CT HEAD/BRAIN W/O DYE: CPT

## 2020-10-10 PROCEDURE — 99285 EMERGENCY DEPT VISIT HI MDM: CPT

## 2020-10-10 PROCEDURE — 74011000250 HC RX REV CODE- 250: Performed by: EMERGENCY MEDICINE

## 2020-10-10 PROCEDURE — 72125 CT NECK SPINE W/O DYE: CPT

## 2020-10-10 PROCEDURE — 75810000293 HC SIMP/SUPERF WND  RPR

## 2020-10-10 PROCEDURE — 81001 URINALYSIS AUTO W/SCOPE: CPT

## 2020-10-10 RX ORDER — BACITRACIN 500 UNIT/G
1 PACKET (EA) TOPICAL
Status: COMPLETED | OUTPATIENT
Start: 2020-10-10 | End: 2020-10-10

## 2020-10-10 RX ADMIN — Medication 4 ML: at 18:19

## 2020-10-10 RX ADMIN — BACITRACIN 1 PACKET: 500 OINTMENT TOPICAL at 19:56

## 2020-10-10 NOTE — ED PROVIDER NOTES
HPI  
79 yo F presents with scalp laceration. Patient states she was trying to cut her hair with scissors when she cut her scalp. There is question about the story and there is concern that she may have fallen. This incident was unwitnessed at her facility. Tetanus is up-to-date. Patient has no other complaints or injury. There is concern by the facility of a potential UTI. Patient denies dysuria or hematuria. Past Medical History:  
Diagnosis Date  Dementia ([facility] [address] Past Surgical History:  
Procedure Laterality Date  HX HEENT    
 HX PACEMAKER History reviewed. No pertinent family history. Social History Socioeconomic History  Marital status: SINGLE Spouse name: Not on file  Number of children: Not on file  Years of education: Not on file  Highest education level: Not on file Occupational History  Not on file Social Needs  Financial resource strain: Not on file  Food insecurity Worry: Not on file Inability: Not on file  Transportation needs Medical: Not on file Non-medical: Not on file Tobacco Use  Smoking status: Never Smoker  Smokeless tobacco: Never Used Substance and Sexual Activity  Alcohol use: No  
 Drug use: No  
 Sexual activity: Not on file Lifestyle  Physical activity Days per week: Not on file Minutes per session: Not on file  Stress: Not on file Relationships  Social connections Talks on phone: Not on file Gets together: Not on file Attends Judaism service: Not on file Active member of club or organization: Not on file Attends meetings of clubs or organizations: Not on file Relationship status: Not on file  Intimate partner violence Fear of current or ex partner: Not on file Emotionally abused: Not on file Physically abused: Not on file Forced sexual activity: Not on file Other Topics Concern  Not on file Social History Narrative  Not on file ALLERGIES: Patient has no known allergies. Review of Systems Constitutional: Negative for chills and fever. Respiratory: Negative for cough and shortness of breath. Cardiovascular: Negative for chest pain. Gastrointestinal: Negative for nausea and vomiting. Genitourinary: Negative for dysuria. Musculoskeletal: Negative for neck pain and neck stiffness. Skin: Positive for wound. Neurological: Negative for speech difficulty, weakness, numbness and headaches. All other systems reviewed and are negative. Vitals:  
 10/10/20 1743 10/10/20 1821 10/10/20 1830 BP: (!) 151/111 (!) 163/78 (!) 158/81 Pulse: 73 66 65 Resp: 16 16 16 Temp: 98.4 °F (36.9 °C) SpO2: 94% 94% 94% Weight: 43.5 kg (95 lb 14.4 oz) Physical Exam  
Physical Examination: General appearance - alert, elderly, no acute distress, oriented to person, place, and time and normal appearing weight Eyes - pupils equal and reactive, extraocular eye movements intact HEENT-1.5 cm laceration to right parietal scalp with minimal bleeding Neck - supple, no significant adenopathy, no midline c-spine tenderness Chest - clear to auscultation, no wheezes, rales or rhonchi, symmetric air entry Heart - normal rate, regular rhythm, normal S1, S2, no murmurs, rubs, clicks or gallops Abdomen - soft, nontender, nondistended, no masses or organomegaly Back exam - full range of motion, no tenderness, palpable spasm or pain on motion Neurological - alert, oriented, normal speech, no focal findings or movement disorder noted Musculoskeletal - no joint tenderness, deformity or swelling Extremities - peripheral pulses normal, no pedal edema, no clubbing or cyanosis Skin - normal coloration and turgor, no rashes, no suspicious skin lesions noted MDM Number of Diagnoses or Management Options Closed head injury, initial encounter:  
Laceration of scalp, initial encounter: Amount and/or Complexity of Data Reviewed Clinical lab tests: ordered and reviewed Tests in the radiology section of CPT®: ordered and reviewed Decide to obtain previous medical records or to obtain history from someone other than the patient: yes Obtain history from someone other than the patient: yes (Son) Review and summarize past medical records: yes Independent visualization of images, tracings, or specimens: yes Patient Progress Patient progress: improved Procedures

## 2020-10-10 NOTE — ED NOTES
Attempted to contact staff at [facility] for further information regarding incident. No answer by charge nurse.

## 2020-10-10 NOTE — ED NOTES
Bedside shift change report given to [id number] Quan, RN (oncoming nurse) by Raudel Fermin RN (offgoing nurse). Report included the following information SBAR, ED Summary, MAR and Recent Results.

## 2020-10-10 NOTE — ED TRIAGE NOTES
Triage Note: Patient arrives with her son from [address] [facility] for a head wound. Patient reports she was cutting her hair with scissors when she accidentally cut her head. Son also reports the staff are concerned she may have a UTI.

## 2020-10-10 NOTE — ED NOTES
Procedure Note - Laceration Repair: 
7:22 PM 
Procedure by Gael Rogel MD. Complexity: complex 1.5 cm linear laceration to scalp  was irrigated copiously with NS under jet lavage, prepped with Betadine and draped in a sterile fashion. The area was anesthetized via topical application of  LET. The wound was explored with the following results: No foreign bodies found. The wound was repaired with 3 staples. The wound was closed with good hemostasis and approximation. Sterile dressing applied. Estimated blood loss: 0mL The procedure took 1-15 minutes, and pt tolerated well.

## 2020-10-11 NOTE — ED NOTES
Dr. Dudley Kemah reviewed discharge instructions with the patient and caregiver. The patient and caregiver verbalized understanding. Patient's wound is closed with staples. There is no bleeding. Patient's wound is dressed with non-stick dressing, Pancho, and Coban. Patient is in no apparent distress.

## 2020-10-11 NOTE — DISCHARGE INSTRUCTIONS
Patient Education        Learning About a Closed Head Injury  What is a closed head injury? A closed head injury happens when your head gets hit hard. The strong force of the blow causes your brain to shake in your skull. This movement can cause the brain to bruise, swell, or tear. Sometimes nerves or blood vessels also get damaged. This can cause bleeding in or around the brain. A concussion is a type of closed head injury. What are the symptoms? If you have a mild concussion, you may have a mild headache or feel \"not quite right. \" These symptoms are common. They usually go away over a few days to 4 weeks. But sometimes after a concussion, you feel like you can't function as well as before the injury. And you have new symptoms. This is called postconcussive syndrome. You may:  · Find it harder to solve problems, think, concentrate, or remember. · Have headaches. · Have changes in your sleep patterns, such as not being able to sleep or sleeping all the time. · Have changes in your personality. · Not be interested in your usual activities. · Feel angry or anxious without a clear reason. · Lose your sense of taste or smell. · Be dizzy, lightheaded, or unsteady. It may be hard to stand or walk. How is a closed head injury treated? Any person who may have a concussion needs to see a doctor. Some people have to stay in the hospital to be watched. Others can go home safely. If you go home, follow your doctor's instructions. He or she will tell you if you need someone to watch you closely for the next 24 hours or longer. Rest is the best treatment. Get plenty of sleep at night. And try to rest during the day. · Avoid activities that are physically or mentally demanding. These include housework, exercise, and schoolwork. And don't play video games, send text messages, or use the computer. You may need to change your school or work schedule to be able to avoid these activities.   · Ask your doctor when it's okay to drive, ride a bike, or operate machinery. · Take an over-the-counter pain medicine, such as acetaminophen (Tylenol), ibuprofen (Advil, Motrin), or naproxen (Aleve). Be safe with medicines. Read and follow all instructions on the label. · Check with your doctor before you use any other medicines for pain. · Do not drink alcohol or use illegal drugs. They can slow recovery. They can also increase your risk of getting a second head injury. Follow-up care is a key part of your treatment and safety. Be sure to make and go to all appointments, and call your doctor if you are having problems. It's also a good idea to know your test results and keep a list of the medicines you take. Where can you learn more? Go to http://www.gray.com/  Enter E235 in the search box to learn more about \"Learning About a Closed Head Injury. \"  Current as of: November 20, 2019               Content Version: 12.6  © 0266-1207 [employer]. Care instructions adapted under license by [employer] (which disclaims liability or warranty for this information). If you have questions about a medical condition or this instruction, always ask your healthcare professional. [place] [address] any warranty or liability for your use of this information. Patient Education        Cuts Closed With Staples: Care Instructions  Your Care Instructions  A cut can happen anywhere on your body. The doctor used staples to close the cut. Staples easily and quickly close a cut, which helps the cut heal.  Sometimes a cut can injure tendons, blood vessels, or nerves. If the cut went deep and through the skin, the doctor may have put in a layer of stitches below the staples. The deeper layer of stitches brings the deep part of the cut together. These stitches will dissolve and don't need to be removed. The staples in the upper layer are what you see on the cut. You may have a bandage. You will need to have the staples removed, usually in 7 to 14 days. The doctor has checked you carefully, but problems can develop later. If you notice any problems or new symptoms, get medical treatment right away. Follow-up care is a key part of your treatment and safety. Be sure to make and go to all appointments, and call your doctor if you are having problems. It's also a good idea to know your test results and keep a list of the medicines you take. How can you care for yourself at home? · Keep the cut dry for the first 24 to 48 hours. After this, you can shower if your doctor okays it. Pat the cut dry. · Don't soak the cut, such as in a bathtub. Your doctor will tell you when it's safe to get the cut wet. · If your doctor told you how to care for your cut, follow your doctor's instructions. If you did not get instructions, follow this general advice:  ? After the first 24 to 48 hours, wash around the cut with clean water 2 times a day. Don't use hydrogen peroxide or alcohol, which can slow healing. ? You may cover the cut with a thin layer of petroleum jelly, such as Vaseline, and a nonstick bandage. ? Apply more petroleum jelly and replace the bandage as needed. · Avoid any activity that could cause your cut to reopen. · Do not remove the staples on your own. Your doctor will tell you when to come back to have the staples removed. · Take pain medicines exactly as directed. ? If the doctor gave you a prescription medicine for pain, take it as prescribed. ? If you are not taking a prescription pain medicine, ask your doctor if you can take an over-the-counter medicine. When should you call for help? Call your doctor now or seek immediate medical care if:    · You have new pain, or your pain gets worse.     · The skin near the cut is cold or pale or changes color.     · You have tingling, weakness, or numbness near the cut.     · The cut starts to bleed, and blood soaks through the bandage. Oozing small amounts of blood is normal.     · You have trouble moving the area near the cut.     · You have symptoms of infection, such as:  ? Increased pain, swelling, warmth, or redness around the cut.  ? Red streaks leading from the cut.  ? Pus draining from the cut.  ? A fever. Watch closely for changes in your health, and be sure to contact your doctor if:    · You do not get better as expected. Where can you learn more? Go to http://www.gray.com/  Enter Q035 in the search box to learn more about \"Cuts Closed With Staples: Care Instructions. \"  Current as of: June 26, 2019               Content Version: 12.6  © 6318-3467 [employer]. Care instructions adapted under license by [employer] (which disclaims liability or warranty for this information). If you have questions about a medical condition or this instruction, always ask your healthcare professional. [address] any warranty or liability for your use of this information. We hope that we have addressed all of your medical concerns. The examination and treatment you received in the Emergency Department were for an emergent problem and were not intended as complete care. It is important that you follow up with your healthcare provider(s) for ongoing care. If your symptoms worsen or do not improve as expected, and you are unable to reach your usual health care provider(s), you should return to the Emergency Department. Today's healthcare is undergoing tremendous change, and patient satisfaction surveys are one of the many tools to assess the quality of medical care. You may receive a survey from the [employer] regarding your experience in the Emergency Department. I hope that your experience has been completely positive, particularly the medical care that I provided.   As such, please participate in the survey; anything less than excellent does not meet my expectations or intentions. [facility] and [facility] participate in nationally recognized quality of care measures. If your blood pressure is greater than 120/80, as reported below, we urge that you seek medical care to address the potential of high blood pressure, commonly known as hypertension. Hypertension can be hereditary or can be caused by certain medical conditions, pain, stress, or \"white coat syndrome. \"       Please make an appointment with your health care provider(s) for follow up of your Emergency Department visit. VITALS:   Patient Vitals for the past 8 hrs:   Temp Pulse Resp BP SpO2   10/10/20 1930 -- 68 16 (!) 137/96 96 %   10/10/20 1900 -- 66 16 (!) 160/78 95 %   10/10/20 1830 -- 65 16 (!) 158/81 94 %   10/10/20 1821 -- 66 16 (!) 163/78 94 %   10/10/20 1743 98.4 °F (36.9 °C) 73 16 (!) 151/111 94 %          Thank you for allowing us to provide you with medical care today. We realize that you have many choices for your emergency care needs. Please choose us in the future for any continued health care needs. Leopoldo Capes [address], [address].   Office: [phone]            Recent Results (from the past 24 hour(s))   URINALYSIS W/MICROSCOPIC    Collection Time: 10/10/20  8:07 PM   Result Value Ref Range    Color YELLOW/STRAW      Appearance CLEAR CLEAR      Specific gravity 1.026 1.003 - 1.030      pH (UA) 5.5 5.0 - 8.0      Protein TRACE (A) NEG mg/dL    Glucose Negative NEG mg/dL    Ketone Negative NEG mg/dL    Bilirubin Negative NEG      Blood Negative NEG      Urobilinogen 0.2 0.2 - 1.0 EU/dL    Nitrites Negative NEG      Leukocyte Esterase Negative NEG      WBC 0-4 0 - 4 /hpf    RBC 0-5 0 - 5 /hpf    Epithelial cells FEW FEW /lpf    Bacteria Negative NEG /hpf       Ct Head Wo Cont    Result Date: 10/10/2020  Indication:  possible fall?  Laceration to posterior head Comparison: February 2020 Findings: 5 mm axial images were obtained from the skull base through the vertex. CT dose reduction was achieved through the use of a standardized protocol tailored for this examination and automatic exposure control for dose modulation. The ventricles and cortical sulci are prominent, compatible with age related volume loss. There is no evidence of intracranial hemorrhage, mass, mass effect, or acute infarct. There is periventricular white matter disease. No extra-axial fluid collections are seen. The visualized paranasal sinuses and mastoid air cells are clear. The orbital structures are unremarkable. No osseous abnormalities are seen. Impression: 1. No evidence of acute infarct or intracranial hemorrhage. 2. Periventricular white matter disease is likely secondary to chronic small vessel ischemic changes. 3. Focal right posterior parietal-occipital scalp laceration. Ct Spine Cerv Wo Cont    Result Date: 10/10/2020  INDICATION:   possible fall COMPARISON: None. TECHNIQUE:   Noncontrast axial CT imaging of the cervical spine was performed. Coronal and sagittal reconstructions were obtained. CT dose reduction was achieved through the use of a standardized protocol tailored for this examination and automatic exposure control for dose modulation. FINDINGS: There is no evidence of acute osseous abnormality. There is no acute alignment abnormality. Vertebral body heights are maintained. There is no appreciable prevertebral soft tissue swelling or epidural hematoma. There is severe multilevel degenerative spondylosis. There is multilevel central canal and bilateral neuroforaminal stenosis. Evaluation of the paraspinal soft tissues demonstrates no significant pathology. The visualized lung apices are clear. There is a right mastoid effusion. There is partial occlusion of both external auditory canals by cerumen. IMPRESSION: 1. No acute osseous abnormality.  2. Multilevel degenerative spondylosis.

## 2020-10-11 NOTE — ED NOTES
Patient ambulated to the bathroom with assistance. Patient provided a urine sample in a \"hat\". Patient tolerated ambulation well.

## 2021-01-01 ENCOUNTER — HOME CARE VISIT (OUTPATIENT)
Dept: SCHEDULING | Facility: HOME HEALTH | Age: 86
End: 2021-01-01
Payer: MEDICARE

## 2021-01-01 ENCOUNTER — HOME CARE VISIT (OUTPATIENT)
Dept: HOSPICE | Facility: HOSPICE | Age: 86
End: 2021-01-01
Payer: MEDICARE

## 2021-01-01 ENCOUNTER — HOSPITAL ENCOUNTER (EMERGENCY)
Age: 86
Discharge: HOME | End: 2021-04-23
Attending: EMERGENCY MEDICINE
Payer: MEDICARE

## 2021-01-01 ENCOUNTER — HOSPITAL ENCOUNTER (EMERGENCY)
Age: 86
Discharge: HOME | End: 2021-01-23
Attending: STUDENT IN AN ORGANIZED HEALTH CARE EDUCATION/TRAINING PROGRAM
Payer: MEDICARE

## 2021-01-01 ENCOUNTER — HOSPICE ADMISSION (OUTPATIENT)
Dept: HOSPICE | Facility: HOSPICE | Age: 86
End: 2021-01-01
Payer: MEDICARE

## 2021-01-01 VITALS
HEART RATE: 100 BPM | RESPIRATION RATE: 18 BRPM | OXYGEN SATURATION: 82 % | DIASTOLIC BLOOD PRESSURE: 52 MMHG | TEMPERATURE: 97.2 F | SYSTOLIC BLOOD PRESSURE: 100 MMHG

## 2021-01-01 VITALS
HEART RATE: 63 BPM | RESPIRATION RATE: 13 BRPM | SYSTOLIC BLOOD PRESSURE: 124 MMHG | DIASTOLIC BLOOD PRESSURE: 72 MMHG | TEMPERATURE: 97.2 F | OXYGEN SATURATION: 92 %

## 2021-01-01 VITALS
DIASTOLIC BLOOD PRESSURE: 70 MMHG | HEART RATE: 77 BPM | TEMPERATURE: 98.2 F | RESPIRATION RATE: 15 BRPM | SYSTOLIC BLOOD PRESSURE: 130 MMHG

## 2021-01-01 VITALS
BODY MASS INDEX: 18.95 KG/M2 | HEIGHT: 59 IN | OXYGEN SATURATION: 97 % | HEART RATE: 64 BPM | WEIGHT: 94 LBS | RESPIRATION RATE: 16 BRPM

## 2021-01-01 VITALS
TEMPERATURE: 98.4 F | DIASTOLIC BLOOD PRESSURE: 70 MMHG | SYSTOLIC BLOOD PRESSURE: 124 MMHG | HEART RATE: 72 BPM | RESPIRATION RATE: 16 BRPM

## 2021-01-01 VITALS
OXYGEN SATURATION: 95 % | TEMPERATURE: 98.3 F | DIASTOLIC BLOOD PRESSURE: 75 MMHG | SYSTOLIC BLOOD PRESSURE: 139 MMHG | BODY MASS INDEX: 17.52 KG/M2 | RESPIRATION RATE: 16 BRPM | HEART RATE: 63 BPM | WEIGHT: 89.73 LBS

## 2021-01-01 VITALS
RESPIRATION RATE: 16 BRPM | TEMPERATURE: 97.7 F | SYSTOLIC BLOOD PRESSURE: 120 MMHG | HEART RATE: 85 BPM | DIASTOLIC BLOOD PRESSURE: 64 MMHG

## 2021-01-01 VITALS
HEART RATE: 74 BPM | DIASTOLIC BLOOD PRESSURE: 78 MMHG | SYSTOLIC BLOOD PRESSURE: 140 MMHG | RESPIRATION RATE: 16 BRPM | TEMPERATURE: 98 F

## 2021-01-01 VITALS — RESPIRATION RATE: 18 BRPM | OXYGEN SATURATION: 91 % | HEART RATE: 64 BPM

## 2021-01-01 VITALS
SYSTOLIC BLOOD PRESSURE: 118 MMHG | OXYGEN SATURATION: 80 % | HEART RATE: 80 BPM | TEMPERATURE: 97.3 F | RESPIRATION RATE: 20 BRPM | DIASTOLIC BLOOD PRESSURE: 72 MMHG

## 2021-01-01 VITALS
RESPIRATION RATE: 18 BRPM | OXYGEN SATURATION: 97 % | DIASTOLIC BLOOD PRESSURE: 68 MMHG | TEMPERATURE: 98 F | SYSTOLIC BLOOD PRESSURE: 122 MMHG | HEART RATE: 78 BPM

## 2021-01-01 VITALS
RESPIRATION RATE: 13 BRPM | DIASTOLIC BLOOD PRESSURE: 70 MMHG | HEART RATE: 80 BPM | SYSTOLIC BLOOD PRESSURE: 143 MMHG | TEMPERATURE: 98.2 F

## 2021-01-01 VITALS — DIASTOLIC BLOOD PRESSURE: 62 MMHG | SYSTOLIC BLOOD PRESSURE: 120 MMHG | HEART RATE: 64 BPM | RESPIRATION RATE: 16 BRPM

## 2021-01-01 VITALS
HEART RATE: 59 BPM | RESPIRATION RATE: 16 BRPM | SYSTOLIC BLOOD PRESSURE: 146 MMHG | TEMPERATURE: 98.3 F | DIASTOLIC BLOOD PRESSURE: 84 MMHG | OXYGEN SATURATION: 94 %

## 2021-01-01 VITALS
RESPIRATION RATE: 14 BRPM | DIASTOLIC BLOOD PRESSURE: 67 MMHG | SYSTOLIC BLOOD PRESSURE: 124 MMHG | TEMPERATURE: 97.5 F | HEART RATE: 78 BPM

## 2021-01-01 VITALS
TEMPERATURE: 98 F | SYSTOLIC BLOOD PRESSURE: 113 MMHG | RESPIRATION RATE: 12 BRPM | HEART RATE: 74 BPM | DIASTOLIC BLOOD PRESSURE: 83 MMHG

## 2021-01-01 VITALS
SYSTOLIC BLOOD PRESSURE: 114 MMHG | DIASTOLIC BLOOD PRESSURE: 70 MMHG | HEART RATE: 76 BPM | TEMPERATURE: 98.6 F | RESPIRATION RATE: 16 BRPM

## 2021-01-01 VITALS
RESPIRATION RATE: 12 BRPM | SYSTOLIC BLOOD PRESSURE: 112 MMHG | TEMPERATURE: 98.6 F | HEART RATE: 78 BPM | DIASTOLIC BLOOD PRESSURE: 68 MMHG

## 2021-01-01 DIAGNOSIS — S01.01XA LACERATION OF SCALP, INITIAL ENCOUNTER: Primary | ICD-10-CM

## 2021-01-01 DIAGNOSIS — S09.90XA CLOSED HEAD INJURY, INITIAL ENCOUNTER: Primary | ICD-10-CM

## 2021-01-01 DIAGNOSIS — S01.01XA LACERATION OF SCALP, INITIAL ENCOUNTER: ICD-10-CM

## 2021-01-01 DIAGNOSIS — S09.90XA INJURY OF HEAD, INITIAL ENCOUNTER: ICD-10-CM

## 2021-01-01 PROCEDURE — G0156 HHCP-SVS OF AIDE,EA 15 MIN: HCPCS

## 2021-01-01 PROCEDURE — 0651 HSPC ROUTINE HOME CARE

## 2021-01-01 PROCEDURE — G0299 HHS/HOSPICE OF RN EA 15 MIN: HCPCS

## 2021-01-01 PROCEDURE — 3336500001 HSPC ELECTION

## 2021-01-01 PROCEDURE — 99283 EMERGENCY DEPT VISIT LOW MDM: CPT

## 2021-01-01 PROCEDURE — HOSPICE MEDICATION HC HH HOSPICE MEDICATION

## 2021-01-01 PROCEDURE — 75810000293 HC SIMP/SUPERF WND  RPR

## 2021-01-01 PROCEDURE — 74011000250 HC RX REV CODE- 250: Performed by: EMERGENCY MEDICINE

## 2021-01-01 PROCEDURE — G0155 HHCP-SVS OF CSW,EA 15 MIN: HCPCS

## 2021-01-01 PROCEDURE — 3331090004 HSPC SERVICE INTENSITY ADD-ON

## 2021-01-01 RX ORDER — FAMOTIDINE 20 MG/1
20 TABLET, FILM COATED ORAL 2 TIMES DAILY
COMMUNITY

## 2021-01-01 RX ORDER — ESCITALOPRAM OXALATE 10 MG/1
10 TABLET ORAL DAILY
COMMUNITY
Start: 2021-01-01

## 2021-01-01 RX ORDER — BACITRACIN 500 UNIT/G
1 PACKET (EA) TOPICAL
Status: COMPLETED | OUTPATIENT
Start: 2021-01-01 | End: 2021-01-01

## 2021-01-01 RX ORDER — MIRTAZAPINE 15 MG/1
15 TABLET, FILM COATED ORAL
COMMUNITY
Start: 2021-01-01

## 2021-01-01 RX ORDER — TRIAMCINOLONE ACETONIDE 1 MG/G
CREAM TOPICAL 2 TIMES DAILY
COMMUNITY
Start: 2021-01-01

## 2021-01-01 RX ADMIN — BACITRACIN 1 PACKET: 500 OINTMENT TOPICAL at 17:12

## 2021-01-01 RX ADMIN — HYOSCYAMINE SULFATE 0.12 MG: 0.12 TABLET SUBLINGUAL at 17:15

## 2021-01-01 RX ADMIN — MORPHINE SULFATE 5 MG: 20 SOLUTION ORAL at 13:20

## 2021-01-23 NOTE — ED PROVIDER NOTES
The patient is a 8-year-old female prior history of dementia presenting today secondary to fall with a head injury. With her LPN at her memory care unit when she missed the toilet and fell backwards, striking her head. No loss of consciousness. She has a small laceration behind the right ear. She has no complaints. They took her blood pressure and it was thought to be low however upon arrival here it is actually elevated. She is not on blood thinners. Patient has dementia therefore history is limited; family at bedside provides history. Full history, physical exam, and ROS unable to be obtained due to:  dementia. Past Medical History:  
Diagnosis Date  Dementia ([facility] [address]  GERD (gastroesophageal reflux disease) Past Surgical History:  
Procedure Laterality Date  HX HEENT    
 HX PACEMAKER History reviewed. No pertinent family history. Social History Socioeconomic History  Marital status: SINGLE Spouse name: Not on file  Number of children: Not on file  Years of education: Not on file  Highest education level: Not on file Occupational History  Not on file Social Needs  Financial resource strain: Not on file  Food insecurity Worry: Not on file Inability: Not on file  Transportation needs Medical: Not on file Non-medical: Not on file Tobacco Use  Smoking status: Never Smoker  Smokeless tobacco: Never Used Substance and Sexual Activity  Alcohol use: No  
 Drug use: No  
 Sexual activity: Not on file Lifestyle  Physical activity Days per week: Not on file Minutes per session: Not on file  Stress: Not on file Relationships  Social connections Talks on phone: Not on file Gets together: Not on file Attends Amish service: Not on file Active member of club or organization: Not on file Attends meetings of clubs or organizations: Not on file Relationship status: Not on file  Intimate partner violence Fear of current or ex partner: Not on file Emotionally abused: Not on file Physically abused: Not on file Forced sexual activity: Not on file Other Topics Concern  Not on file Social History Narrative  Not on file ALLERGIES: Patient has no known allergies. Review of Systems Unable to perform ROS: Dementia Vitals:  
 01/23/21 1726 BP: (!) 146/84 Pulse: (!) 59 Resp: 16 Temp: 98.3 °F (36.8 °C) SpO2: 94% Physical Exam 
Vitals signs and nursing note reviewed. Constitutional:   
   General: She is not in acute distress. Appearance: She is well-developed. She is not diaphoretic. HENT:  
   Head:  
   Comments: Small approximately 1 cm superficial laceration to the right scalp just behind the ear. No cephalhematoma, raccoon eyes or blue sign. Mouth/Throat:  
   Pharynx: No oropharyngeal exudate. Eyes:  
   General:     
   Right eye: No discharge. Left eye: No discharge. Pupils: Pupils are equal, round, and reactive to light. Neck: Musculoskeletal: Normal range of motion and neck supple. Cardiovascular:  
   Rate and Rhythm: Normal rate and regular rhythm. Heart sounds: Normal heart sounds. No murmur. No friction rub. No gallop. Pulmonary:  
   Effort: Pulmonary effort is normal. No respiratory distress. Breath sounds: Normal breath sounds. No stridor. No wheezing or rales. Abdominal:  
   General: Bowel sounds are normal. There is no distension. Palpations: Abdomen is soft. Tenderness: There is no abdominal tenderness. There is no guarding or rebound. Musculoskeletal: Normal range of motion. General: No deformity. Comments: No C/T/L spine tenderness No chest wall tenderness, no crepitus, no flail segment Upper and lower extremities fully ranged, visualized, and palpated without tenderness or deformity. The hips are non-tender with bilateral compression Pelvis is stable Skin: 
   General: Skin is warm and dry. Capillary Refill: Capillary refill takes less than 2 seconds. Findings: No rash. Neurological:  
   Mental Status: She is alert. Comments: Awake and alert Follows commands No focal deficits Psychiatric:  
   Comments: cooperative Would cleansed with surecleanse 5:40 PM 
Procedure Note - LACERATION REPAIR: 
 
Wound Location:SCALP Wound Size: 1cm Debridement: No 
Nerve Involvement: No 
Tendon Involvement: No 
Foreign Bodies Found:  None Technique: DERMABOND Procedure was well tolerated with no complications. 80 y.o. female here with small scalp laceration after fall. She has no other obvious injuries. Question possible low bp at [facility] but here she is actually hypertensive. Not on blood thinners. Family adamant about not getting CT at this time (has had several in the past 12 months) despite risk of ICH/skull fx. Wound repaired with dermabond as above. Return precautions provided. ICD-10-CM ICD-9-CM 1. Laceration of scalp, initial encounter  S01. 01XA 873.0 2. Injury of head, initial encounter  S09.90XA 959.01   
 
ALLA Aj DO

## 2021-01-23 NOTE — DISCHARGE INSTRUCTIONS
PLEASE KEEP A CLOSE EYE ON YOUR WOUND(S). IF YOU NOTICE RED STREAKING, INCREASING DRAINAGE, WORSENING REDNESS, OR FEVER THEN PLEASE RETURN IMMEDIATELY. Seek immediate care for increased sleepiness, irritability, focal deficits (not moving an arm or leg, face looks different, numbness) and vomiting more than once.

## 2021-01-23 NOTE — ED NOTES
Pt discharged in stable condition at this time. MD reviewed discharge instructions and follow up with patient and daughter in law at bedside. Pt and daughter in law verbalized understanding and denies any needs or questions.

## 2021-01-23 NOTE — ED TRIAGE NOTES
Pt arrives with daughter in law s/p fall. Pt and LPN were getting up to go to the bathroom and pt missed the toilet. Fall was witnessed, denies LOC. Laceration behind right ear. BP low, per daugther in law BP is always low. Pt lives in memory care unit at [facility]. Staff wanted patient to be checked out.

## 2021-04-23 NOTE — ED TRIAGE NOTES
TRIAGE NOTE: [facility] from [facility], where she tripped and hit her head on door. Was witnessed, no LOC. Pt has hx of falls, accompanied by son.

## 2021-04-23 NOTE — ED PROVIDER NOTES
The history is provided by a relative. No  was used. Fall The accident occurred 1 to 2 hours ago. The fall occurred while standing. The volume of blood lost was minimal. The point of impact was the head. The pain is present in the head. Associated symptoms include laceration. Pertinent negatives include no loss of consciousness. She has tried nothing for the symptoms. The treatment provided no relief. It is unknown when the patient last had a tetanus shot. Past Medical History:  
Diagnosis Date  Dementia ([address]  GERD (gastroesophageal reflux disease) Past Surgical History:  
Procedure Laterality Date  HX HEENT    
 HX PACEMAKER History reviewed. No pertinent family history. Social History Socioeconomic History  Marital status: SINGLE Spouse name: Not on file  Number of children: Not on file  Years of education: Not on file  Highest education level: Not on file Occupational History  Not on file Social Needs  Financial resource strain: Not on file  Food insecurity Worry: Not on file Inability: Not on file  Transportation needs Medical: Not on file Non-medical: Not on file Tobacco Use  Smoking status: Never Smoker  Smokeless tobacco: Never Used Substance and Sexual Activity  Alcohol use: No  
 Drug use: No  
 Sexual activity: Not on file Lifestyle  Physical activity Days per week: Not on file Minutes per session: Not on file  Stress: Not on file Relationships  Social connections Talks on phone: Not on file Gets together: Not on file Attends Caodaism service: Not on file Active member of club or organization: Not on file Attends meetings of clubs or organizations: Not on file Relationship status: Not on file  Intimate partner violence Fear of current or ex partner: Not on file Emotionally abused: Not on file   Physically abused: Not on file  
  Forced sexual activity: Not on file Other Topics Concern  Not on file Social History Narrative  Not on file ALLERGIES: Patient has no known allergies. Review of Systems Unable to perform ROS: Other (hard of hearing) Neurological: Negative for loss of consciousness. Vitals:  
 04/23/21 1624 BP: 139/75 Pulse: 63 Resp: 16 Temp: 98.3 °F (36.8 °C) SpO2: 95% Weight: 40.7 kg (89 lb 11.6 oz) Physical Exam 
Vitals signs and nursing note reviewed. Constitutional:   
   General: She is not in acute distress. Appearance: She is well-developed. She is not diaphoretic. HENT:  
   Head:  
 
Neck:  
   Trachea: No tracheal deviation. Cardiovascular:  
   Comments: Warm and well perfused Pulmonary:  
   Effort: Pulmonary effort is normal. No respiratory distress. Musculoskeletal: Normal range of motion. Skin: 
   General: Skin is warm and dry. Findings: Laceration present. Neurological:  
   Mental Status: She is alert. Coordination: Coordination normal.  
 
  
 
MDM This is a 8-year-old female with past medical history, review of systems, physical exam as above, presenting with complaints of scalp laceration. Patient extremely hard of hearing, patient son at bedside. He states he received a call from her nursing home, stating she was walking without her walker and stepped backward, striking her head on a door jam.  Son states there is no report of loss of consciousness, no nausea or vomiting. He notes that patient has had several visits for the same. He denies recent changes in her health or medications. Physical exam is remarkable for a well-appearing elderly female, in no acute distress with scattered ecchymosis throughout her upper and lower extremities, approximately 3 cm scalp laceration, hemostatic at the time of evaluation.   Discussed with son at bedside and recommended closure and imaging, he prefers to defer imaging at this time.  Patient appears to deny pain, thus will withhold pain medication at this time. Plan for primary closure with staples after irrigation and treatment with topical antibiotics, will recommend primary care follow-up for staple removal, fall precautions. WOUND REPAIR Date/Time: 4/23/2021 5:12 PM 
Performed by: attendingPreparation: skin prepped with Shur-Clens Location details: scalp Wound length:2.6 - 7.5 cm Skin closure: staples Number of sutures: 4 Technique: simple and interrupted Dressing: antibiotic ointment Patient tolerance: patient tolerated the procedure well with no immediate complications My total time at bedside, performing this procedure was 1-15 minutes.

## 2021-10-14 NOTE — HOSPICE
[facility]: Late Effect of Senile Dementia   Diagnoses RELATED to the terminal prognosis: Functional Decline, Protein Calorie Malnutrition  UNRELATED Diagnoses: Sick Sinus Syndrome, s/p Pacemaker, Depression, Gastric Reflux Disease  Date of Hospice Admission: 10/13/2021  Hospice Attending Elected by Patient: Dr. Magalis Valdez MD  PCP: Dr. Magalis Valdez MD  Admitting RN: Jeanine Crawford, MSN, RN  : Alla PATRICIA  : Garrett Boudreaux  Music Therapy - requested    Durable DNR - Yes   Home: Not discussed    ADMISSION ASSESSMENT/ DIRECT OBSERVATION:  Ms. Emily Payan was lying in bed, she was somnolent, yet cooperative for the assessment, she was able to turn in bed with minimal assistance for a skin assessment. Sacral area was intact, although red, heels intact. She answered simple questions appropriately when asked. Large hematoma to R occipital area from recent fall. Unit staff report recent decline in weight, at one point she was 85lbs, now increased to 93lb with addition of calorie supplements to diet. She has become more somnolent and less interactive with staff and family and displays labile mood swings on occasions. She requires more supervision for safety than the memory unit are equipped to provide. Prior to leaving the Memory Unit Ms. Emily Payan had been transferred in her wheelchair to the dining room for her evening meal, she was more alert and attentive. SOCIAL:   Resides at [address], Memory [address], [address]  Unit staff are concerned that Ms. Emily Payan requires a higher level of skilled nursing. Per son Cecilia Ramirez they have discussed this with him. This RN explained that [address] [facility] are unable to provide a level of A aide support/availability to augment the care that the Memory Unit can realistically provide.     HOSPITAL COURSE LEADING TO ADMISSION TO HOSPICE:   Excerpted - ED encounter s/p fall  This is a 8-year-old female with past medical history, review of systems, physical exam as above, presenting with complaints of scalp laceration. Patient extremely hard of hearing, patient son at bedside. He states he received a call from her nursing home, stating she was walking without her walker and stepped backward, striking her head on a door jam. Son states there is no report of loss of consciousness, no nausea or vomiting. He notes that patient has had several visits for the same. He denies recent changes in her health or medications. Physical exam is remarkable for a well-appearing elderly female, in no acute distress with scattered ecchymosis throughout her upper and lower extremities, approximately 3 cm scalp laceration, hemostatic at the time of evaluation. Discussed with son at bedside and recommended closure and imaging, he prefers to defer imaging at this time. Patient appears to deny pain, thus will withhold pain medication at this time. Plan for primary closure with staples after irrigation and treatment with topical antibiotics, will recommend primary care follow-up for staple removal, fall precautions. Per review of chart Ms. Mateo Young has received assessment in the ED on 4 occasions in the past 18months s/p falls. She transferred to the Memory Unit from [place] [address] a year ago due to her decline in mental and functional abilities. Unit staff report recent decline in weight, at one point she was 85lbs, now increased to 193lb with addition of calorie supplements to diet. She has become more somnolent and less interactive with staff and family and displays labile mood swings on occasions. She requires more supervision for safety than the memory unit are able to provide and this, per her son Rose Kos the staff have spoken to him about. Objective information that support this patient's limited prognosis includes:   LCD Guidelines:   1. Progression of disease as documented by worsening clinical status, symptoms, signs and laboratory results  A.  Clinical Status  1) Recurrent or intractable infections such as pneumonia, sepsis or upper urinary tract. 2) Progressive inanition as documented by:  a) Weight loss not due to reversible causes such as depression or use of diuretics  b) Decreasing anthropomorphic measurements (mid-arm circumference, abdominal girth), not due to reversible causes such as depression or use of diuretics  c) Decreasing serum albumin or cholesterol  3) Dysphagia leading to recurrent aspiration and/or inadequate oral intake documented by decreasing food portion consumption. B. Symptoms  1) Dyspnea with increasing respiratory rate  2) Cough, intractable   3) Nausea/vomiting poorly responsive to treatment  4) Diarrhea, intractable  5) Pain requiring increasing doses of major analgesics more than briefly. C. Signs  1) Decline in systolic blood pressure to below 90 or progressive postural hypotension  2) Ascites  3) Venous, arterial or lymphatic obstruction due to local progression or metastatic disease  4) Edema  5) Pleural / pericardial effusion  6) Weakness  7) Change in level of consciousness    D. Laboratory (When available. Lab testing is not required to establish hospice eligibility.)  1) Increasing pCO2 or decreasing pO2 or decreasing SaO2  2) Increasing calcium, creatinine or liver function studies  3) Increasing tumor markers (e.g. CEA, PSA)  4) Progressively decreasing or increasing serum sodium or increasing serum potassium    2. Decline in Karnofsky Performance Status (KPS) or Palliative Performance Score (PPS) from <70% due to progression of disease. X Estimated 40%    3. Increasing emergency room visits, hospitalizations, or physician's visits related to hospice primary diagnosis. X    4. Progressive decline in Functional Assessment Staging (FAST) for dementia (from ? 7A on the FAST)    5. Progression to dependence on assistance with additional activities of daily living (See Part II, Section 2) X    6.  Progressive stage 3-4 pressure ulcers in spite of optimal care Hyacinth Cordon MD agrees to serve as the Attending provider for Hospice and provided verbal certification of terminal illness with prognosis of 6 months or less life expectancy. Facility MD was unavailable at time of admission, change of Attending form is required. Orders for hospice admission, medications and plan of treatment received. Medication reconciliation completed. SRK ordered through [facility] - pre-filled syringes requested.  Delivery 10/14/21    DME: Facility provided  Supplies:   IDT communication to include MD, SN, SW, CH and support team.

## 2021-10-15 NOTE — HOSPICE
Patient sitting in recliner when writer arrived. Patient does not respond to writer. Writer did assessment, Lungs are clear abdomen is soft active bowel sounds BM yesterday, no edema noted, no signs of pain noted reviewed medications care plan with [address] [place] [address]. Writer ask facility staff  to call with any questions or concerns. Writer left a message on her son Roger's phone to call writer for up date.

## 2021-10-18 NOTE — HOSPICE
LMSW attempted to meet with patient bedside at [address] [facility] [address]. Patient was asleep and would not rouse when name was called. LMSW checked in with facility staff who had some nursing questions. LMSW followed up with hospice RN regarding request. LMSW called and spoke with patient's son Myra Moser. LMSW inquired about patient's decision makers. LMSW stated that financial POA is on file but no MPOA. Myra Moser stated that patient has AMD, Myra Moser will fax AMD to hospice. Myra Moser stated that he is going to [place] with his wife as his wife's mother is also on hospice up there. Myra Moser had a few questions about Medicare coverage. LMSW explained that hospice services are covered fully by Medicare. Myra Moser stated that [facility]  home will serve as they have prearranged. Patient will be buried in [place] alongside her hsuband. Myra Moser stated that the patient qualifies for Aide and Attendant through the [place] as patient's  spouse was a WW[employer] vet. LMSW stated that LMSW will pass that information along to patient's primary . LMSW discussed the need for a change in physician form and faciliated sending this via [address] [place] [address]. LMSW will continue to be available for needs that arise.

## 2021-10-19 NOTE — HOSPICE
No spiritual care needs expressed by patient or CG, nor have they reached out for support. Will contact patient/family to assess needs and introduce spiritual care services.

## 2021-10-21 NOTE — HOSPICE
Music Therapy Assessment  Ruffus Brown  [facility]   [address], [address] [facility] [place]    Patient Telephone Number: Jenniffer Soni, kt [phone]  Orthodox Affiliation: Latter day  Language: English    Date: 10/20/21    Mental Status:   Sherri.Eneida ] Alert [  ] Rudolm Sherry Sherri.Eneida  ]  Confused  [  ] Minimally responsive  [  ] Sleeping    Communication Status: [ X ] Impaired Speech (low response) [  ] Nonverbal     Physical Status:   [  ] Oxygen in use  [ X ] Hard of Hearing [  ] Vision Impaired   [  ] Ambulatory  [  ] Ambulatory with assistance [  ] Non-ambulatory     Music Preferences, Background: per destinee Alarcon Fair: Lela Dowd    Clinical Problem addressed: _Isolation secondary to confusion; increase social contact;    Goal(s) met in session:  Physical/Pain management (Scale of 1-10):    Pre-session rating ___________    Post-session rating __________  [  ] Increased relaxation               [  ] Affected breathing patterns  [  ] Decreased muscle tension               [  ] Decreased agitation  [  ] Affected heart rate    [  ] Increased alertness     Emotional/Psychological:  Sherri.Eneida ] Increased self-expression   [  ] Decreased aggressive behavior   [  ] Decreased feelings of stress              [  ] Discussed healthy coping skills     [  ] Improved mood    [  ] Decreased withdrawn behavior     Social:  [ X ] Decreased feelings of isolation/loneliness [  ] Positive social interaction    [ X ] Provided support and/or comfort for family/friends    Spiritual:  [  ] Spiritual support    [  ] Expressed peace  [  ] Expressed ty    [  ] Discussed beliefs    Techniques Utilized (Check all that apply):   [  ] Procedural support MT [  ] Music for relaxation             Sherri.Eneida  ] Patient preferred music  [  ] Wendy analysis  [  ] Song choice              [  ] Music for validation  [  ] Entrainment              [  ] Movement to music             [  ] Guided visualization  [  ] Kayla Hernandez  [  ] Patient instrument playing [  ] Brianna Manual writing  [  ] Mario Doom along   [  ] Improvisation              Heath.Tanika  ] Sensory stimulation  [  ] Active Listening  Heath.Tanika ] Music for spiritual support [  ] Making of CDs as gifts    Session Observations: Ms. Cara Nguyễn is a 81 yo resident of [address], with a primary hospice dx of senile dementia. She was sitting up in a common area when music therapist Ernestine Guerrero and this writer approached her. Ms. Ezequiel Rodriguez was agreeable to receiving music in her room, and she was mostly quiet during the introductions, stating, \"I can't hear\" at first, then later, not respoding to questions. She was offered a variety of songs, by artists suggested by her son, played on [employer] and [employer] and sung live. It is unclear wheather she was able to hear the music, as her expression didn't change much, and when asked about the music replied, \"I don't understand. \" Songs used included [address], and songs from the 1940's. Ms. Ezequiel Rodriguez was not able to communicate verbally in any depth, and the MT's offered to bring her to the common area when finished. She appeared comfortable and not in any distress. Thank you for the opportunity to provide music therapy to Ms. Cara Nguyễn.   [place], [place], MT-BC   Music [address] Certified  Spiritual Care Services  Referral- based service

## 2021-10-21 NOTE — HOSPICE
Patient received in wheelchair eating breakfast upon arrival. Patient calm and cooperative during visit and assessment. Staff reports patient's appetite varies from day to day and had a previous fall with no injury. Reviewed fall prevention with staff. Facilty staff voiced no concerns with patient, reinforced hospice 24/7 with staff; they verbalized understanding. No supplies needed.

## 2021-10-27 PROBLEM — Z51.5 HOSPICE CARE: Status: ACTIVE | Noted: 2021-01-01

## 2021-11-02 NOTE — HOSPICE
call initiated to provide comfort and spiritual guidance to the pt, family and CG as they make their journey towards EOL. Upon arrival, Ms Brittany Bailey was watching a movie with the facility community. The patient then moved to her room. Introduced spiritual care services to Ms Brittany Bailey. Engaged in life review w/ son exploring patient's life traveling around the country. Reviewed coping resources: son noted that patient appreciates prayer and music from her [address] as well as showtunes, Boland Saab and Geovanna Rode. Facilitated music playing guitar and singing familiar hymns and songs on the theme of paulette. Led patient and family in praying [place] and  [facility] together. Leatha et al. Assessing Spiritual Concerns in Palliative Care (Completed 10/19/21) Need for Meaning in the Face of Sufferin (patient increasingly nonverbal; family seems to be coping well with transition) Need for integrity, legacy, generativity: 0 (family supportive of patient) Concerns about relationships: family and/or significant others: 2 (family challenged with managing patient's needs and daughter-in-law's mother who is also on hospice in [place]) Issues related to making decisions about treatment: 0 
R/S Struggle: 2 (patient and family are Mormonism; patient no longer able to attend Jehovah's witness due to illness/decline; welcoming of  support for [employer], prayers and music; son active in choir at [employer]. [place] that is supportive of patient's sacramental needs) 0--no evidence of unmet need; (0*--further assessment needed to clarify needs)  1--some evidence of unmet need  2--substantial evidence of unmet need 3--evidence of severe unmet need Total Spiritual Distress Score: 518

## 2021-11-04 NOTE — HOSPICE
Patient recevied sleeping in bed upon arrival, patient drowsy upon assessment. Staff reports patient's appetite varies from day to day, eating one meals some days. Staff voiced no new concerns with patient. Patient incontinent of urine/stool, perineum care provided, barrier cream and clean/dry brief applied. Sacrum red, but intact. Reviewed fall prevention and stratigies to help prevent skin breakdwon with staff. Reinforced hospice 24/7 with facility staff, they verbalized understanding. No supplies needed.

## 2021-11-09 NOTE — HOSPICE
Patient sitting in wheelchair in common room upon arrival for visit; other residents and facility staff present. Patient alert with flat affect. Patient nonverbal during visit; no signs of pain or dyspnea observed. Fair/fluctuating appetite; last bowel movement this morning per staff nurse [address]. Scattered bruising to extremities. No new falls reported. Fall precautions reviewed. Phone call made to patient's son Loan Becker for visit update. Reinforced hospice 24/7 for any concerns or change in patient's condition.

## 2021-11-12 NOTE — HOSPICE
Patient lying in bed in bedroom upon arrival for visit. Patient resting but awoken by this RN and facility CNA. Patient nonverbal during visit; no signs of pain or dyspnea observed. Assisted CNA with incontinence care and transferring patient from bed to wheelchair. Zinc cream applied to excoriated sacrum; staff applying daily. No needs at this time per facility NAGA Russell. Phone call made to patient's son Cecilia Ramirez for update. Reinforced hospice 24/7 for any concerns or change in patient's condition.

## 2021-11-16 NOTE — HOSPICE
Patient lying in bed upon arrival for visit. Patient resting; lethargic. Patient nonverbal during visit. No signs of pain/dyspnea/anxiety observed. Facility LPN Yvonne reports patient has been sleeping in the bed more frequently and eating less. Incontinence care performed and zinc cream applied to excoriated sacrum. Facility staff requesting increase in hospice CNA visits for bed baths; message sent to scheduling. Phone call made to patient's son Eveline Tyson for visit update. Son inquiring about mattress; discussed gel mattress topper and son would like this ordered. Order placed via [employer] (confirmation# [id number]) for delivery. No other needs voiced at this time. Reinforced hospice 24/7 for any concerns or change in patient's condition. Supplies ordered: Briefs, pullups, chux, wipes, zinc cream via [employer].

## 2021-11-23 NOTE — HOSPICE
Patient received sitting in wheelchair upon arrival with breakfast plate in front of her. Staff state that Neris Marquis will eat when she wants too\" Offered to feed patient, she turned her head away. Patient calm, cooperative, and non verbal during visit. Reviewed fall prevention and stratigies to prevent skin breakdown prevention, staff verbalized understanding. Staff report no new concerns/issues. Reinforced hospice 24/7 with facility staff; verbalized understanding. No supplies needed. Son called and updated on visit, he verbalized understanding.

## 2021-11-23 NOTE — HOSPICE
Music Therapy Visit       Date: 11/23/2021    Mental Status:   [x] Alert   [  ] Sukhwinder Hoyos [  ]  Confused  [  ] Minimally responsive  [  ] Sleeping    Communication Status: [  ] Impaired Speech [  ] Nonverbal -N/A    Physical Status:   [  ] Oxygen in use  [  ] Hard of Hearing [  ] Vision Impaired   [  ] Ambulatory  [  ] Ambulatory with assistance [x] Non-ambulatory     Music Preferences, Background: Popular songs from the 52's, hymns, and songs from [address], and [place].     Clinical Problem addressed: Support socialization and spiritual support    Goal(s) met in session:  Physical/Pain management (Scale of 1-10):    Pre-session rating: Pt didn't report on this  Post-session rating: Pt didn't report on this  [  ] Increased relaxation   [  ] Affected breathing patterns  [  ] Decreased muscle tension   [  ] Decreased agitation  [  ] Affected heart rate   [x] Increased alertness     Emotional/Psychological:  [  ] Increased self-expression   [  ] Decreased aggressive behavior   [  ] Decreased feelings of stress  [  ] Discussed healthy coping skills     [  ] Improved mood    [  ] Decreased withdrawn behavior     Social:  [  ] Decreased feelings of isolation/loneliness [x] Positive social interaction    [  ] Provided support and/or comfort for family/friends    Spiritual:  [x] Spiritual support    [  ] Expressed peace  [  ] Expressed ty    [  ] Discussed beliefs    Techniques Utilized (Check all that apply):   [  ] Procedural support MT [  ] Music for relaxation [x] Patient preferred music  [  ] Wendy analysis  [  ] Song choice  [  ] Music for validation  [  ] Entrainment  [  ] Movement to music [  ] Guided visualization  [  ] Jo Broderick  [  ] Patient instrument playing [  ] Jessica Holden writing  [  ] Shobha Norman along   [  ] Nam Álvarez  [  ] Sensory stimulation  [  ] Active Listening  [x] Music for spiritual support [  ] Making of CDs as gifts    Session Observations:  Pt was sitting at a table, finishing up her breakfast when this music therapist eligible (MTE) and  Gary Barrera) approached her. This MTE and  greeted the pt and moved her into her room for a music therapy session. Pt looked at the  and MTE briefly but looked down most of the visit. MTE and  engaged with the pt by singing and playing various song selections with guitar (Truman, [employer], [place], and Oh! What a Beautiful Morning) and discussing topics around the change of weather and the Holidays. At the closing of the session,  provided prayer and thanked the pt for the visit. Pt increased self-expression as evidenced by (AEB) smiling and making eye contact with the  in response to a comment about the [employer] football team. MTE thanked the pt as well, and pt responded to this though it was hard to make out what she said. MTE and  exited at this time.      Jessica Nicole, Music Therapist Eligible  Spiritual Care Department

## 2021-11-23 NOTE — HOSPICE
Routine follow up visit w/ music therapy to provide comfort and spiritual guidance to the patient, family and caregiver as they make their journey towards end of life. Upon arrival, patient was finishing her breakfast. After moving to her room, patient was withdrawn and flat. Engaged in music playing and singing favorite songs and hymns. Reflected on God's faithful presence and affirmed patient's personhood. Offered prayer of blessing and concluded with the [facility] according to her [place] [id number] tradition.     Leatha et al. Assessing Spiritual Concerns in Palliative Care (Completed 10/19/21)   Need for Meaning in the Face of Sufferin (patient increasingly nonverbal; family seems to be coping well with transition)   Need for integrity, legacy, generativity: 0 (family supportive of patient)   Concerns about relationships: family and/or significant others: 2 (family challenged with managing patient's needs and daughter-in-law's mother who is also on hospice in [place])   Issues related to making decisions about treatment: 0   R/S Struggle: 2 (patient and family are Restorationist; patient no longer able to attend [employer] due to illness/decline; welcoming of  support for [employer], prayers and music; son active in choir at [employer]. [place] that is supportive of patient's sacramental needs)   0--no evidence of unmet need; (0*--further assessment needed to clarify needs) 1--some evidence of unmet need 2--substantial evidence of unmet need 3--evidence of severe unmet need   Total Spiritual Distress Score:

## 2021-11-29 NOTE — HOSPICE
PRN SNV to assess accelerated declines reported by staff. Pt curled up asleep in recliner out in common room. Appears comfortable. Assessed pulse and resps. Staff reports pt has stopped eating and drinking over past few days.

## 2021-12-02 NOTE — HOSPICE
SN routine visit   Patient received up in recliner chair in TV lounge with her eyes closed  No S/S of pain or shortness of breath on room air  Breath sounds clear / diminished  No cough noted   Patient kept her eyes closed throughout entire assessment  Staff, Nelida Wright  reports further decline in her status as evidenced by marked decrease in intake, taking only bites, sips  Swallowing is monitored closely  High aspiration risk   Patient is more withdrawn and sleeping mostly now  Non verbal during visit   Requires total adl assistance  to include total transfer bed to chair and feeding   Patient is weak and frail, debilitated   Incontinent of bladder and bowel   Protective cream is applied to sacral area for prevention of breakdown    To continue with current hospice plan of care for comfort  Instructed staff, Yvonne,  to call hospice with concerns / needs

## 2021-12-06 NOTE — HOSPICE
Patient recieved sleeping in bed, patient lethargic and non verbal during visit. Temporal wasting noted, LUMAC 25cm, staff reports patient has not been eating or drinking over the last several days. Reviewed repositioning to help prevent skin breakdown and reinforced hospice 24/7 with facility staff, they verbalized understanding. No supplies needed, son called and updated on visit and POC.

## 2021-12-16 NOTE — HOSPICE
Patient sitting in wheelchair at dining room table upon arrival for visit; other residents and staff members present. Patient alert; nonverbal.  No signs/symptoms of pain, anxiety or dyspnea observed. Patient drinks supplement drink and a few orange pieces during visit. Staff NAGA Bowman reports patient continues to spend majority of day in bed resting. Decreased, poor appetite, as patient is only taking supplement drinks and occasional bites of foods. No needs verbalized by staff at this time. Reinforced hospice 24/7 for any concerns or change in patient's condition. This RN to call patient's son Melquiades Delgado for visit update. Supplies ordered: Briefs, chux, wipes, barrier cream, cleanser via [employer].

## 2021-12-20 NOTE — HOSPICE
Patient received resting in bed, appeared comfortable. Facility staff Fadumo Hernandez states that patient has not eaten the last several days, medications are given with Medpass without difficulty. Sacrum red, skin intact. Zinc paste applied and reinforced repositioning every 3 to 4 hours as tolerated, elevating heels off bed and application of Zinc paste daily with facility staff. Reinforced hospice 24/7 with staff, they verbalized understanding. Son [address] called and updated on visit and plan of care and verbalized understanding.

## 2021-12-22 NOTE — HOSPICE
Routine follow up visit to provide comfort and spiritual guidance to the patient, family and caregiver as they make their journey towards end of life. Upon arrival, Ms Liz Castro was seated at a table finishing her lunch. Engaged music playing [facility]; offered Scripture and prayers on the theme of light in the darkness honoring patient's [address]. Overall, patient was more withdrawn than prior encounters, but gradually grew more calm with engaging familiar rituals from her tradition.     Leatha et al. Assessing Spiritual Concerns in Palliative Care (Completed 10/19/21)   Need for Meaning in the Face of Sufferin (patient increasingly nonverbal; family seems to be coping well with transition)   Need for integrity, legacy, generativity: 0 (family supportive of patient)   Concerns about relationships: family and/or significant others: 2 (family challenged with managing patient's needs and daughter-in-law's mother who is also on hospice in [place])   Issues related to making decisions about treatment: 0   R/S Struggle: 2 (patient and family are Temple; patient no longer able to attend [employer] due to illness/decline; welcoming of  support for [employer], prayers and music; son active in choir at [employer]. Pian that is supportive of patient's sacramental needs)   0--no evidence of unmet need; (0*--further assessment needed to clarify needs) 1--some evidence of unmet need 2--substantial evidence of unmet need 3--evidence of severe unmet need   Total Spiritual Distress Score: 5/18

## 2021-12-28 NOTE — HOSPICE
Music Therapy Visit     Date: 12/28/2021            Mental Status:   [  ] Alert [  ] Elray Fraise [  ]  Confused  [x] Minimally responsive  [  ] Sleeping    Communication Status: [  ] Impaired Speech [  ] Nonverbal -N/A    Physical Status:   [  ] Oxygen in use  [  ] Hard of Hearing [  ] Vision Impaired   [  ] Ambulatory  [  ] Ambulatory with assistance [  ] Non-ambulatory -N/A    Music Preferences, Background: Popular songs from the Informative's and traditional hymns. Clinical Problem addressed: Comfort and spiritual support    Goal(s) met in session:  Physical/Pain management (Scale of 1-10):    Pre-session rating: Pt didn't report on this  Post-session rating: Pt didn't report on this  [  ] Increased relaxation   [  ] Affected breathing patterns  [  ] Decreased muscle tension   [  ] Decreased agitation  [  ] Affected heart rate    [  ] Increased alertness     Emotional/Psychological:  [  ] Increased self-expression   [  ] Decreased aggressive behavior   [  ] Decreased feelings of stress  [  ] Discussed healthy coping skills     [  ] Improved mood    [  ] Decreased withdrawn behavior     Social:  [  ] Decreased feelings of isolation/loneliness [  ] Positive social interaction    [  ] Provided support and/or comfort for family/friends    Spiritual:  [x] Spiritual support    [  ] Expressed peace  [  ] Expressed ty    [  ] Discussed beliefs    Techniques Utilized (Check all that apply):   [  ] Procedural support MT [x] Music for relaxation [  ] Patient preferred music  [  ] Wendy analysis  [  ] Song choice  [x] Music for validation  [  ] Entrainment  [  ] Movement to music [  ] Guided visualization  [  ] Cat Fees  [  ] Patient instrument playing [  ] Boo Vega writing  [  ] Greyson Place along   [  ] Improvisation  [x] Sensory stimulation  [  ] Active Listening  [x] Music for spiritual support [  ] Making of CDs as gifts    Session Observations:   This music therapist eligible (MTE) consulted with a facility nurse regarding the patient's (pt) current state before visiting with the pt. She reported the pt is declining and refusing to eat/drink/take her medication. She also shared the pt has remained in bed all morning. MTE entered the pt's room and saw the pt was lying in bed with her eyes half-way open. MTE greeted her by rubbing her arm and speaking her name. Pt did not respond to this. MTE sang various popular songs (By [employer], [address]) and traditional hymns (In the [employer], [employer], and It Is Well With My Soul) throughout the session. MTE also provided sensory stimulation throughout the visit by tapping the pt's arm and leg along with the tempo of the music, as well as rubbing her shoulder for comfort. MTE provided a supportive presence and words of comfort, mentioning the love her family and God have for her. MTE concluded the session, thanked the pt for the opprtunity to serve her through music therapy, and exited.      Belkys Soriano, Music Cornelius Eligible  Spiritual Care Department   Referral Based Services

## 2021-12-28 NOTE — HOSPICE
Patient resting in bed upon arrival, patient lethargic throughout assessment. Incontinence care provided, redness noted to sacrum. Patient repositioned to left side and heels elevated off bed. Facility staff reports that patient has not eaten in several days and has refused liquids at times. Discussed decline related to hospice diagnosis with staff. Reinforced repositioning every 3 to 4 hours as tolerated and hospice 24/7 with staff, they verbalized understanding. No supplies needed.

## 2021-12-30 NOTE — HOSPICE
RN dispatched due to facility staff reporting patient actively dying. Upon arrival patient unresponsive in bed. Her face is inexpressive with breathing effort normal with periods of apnea. Apnea lasting up to 30 seconds at times. Lungs with rhonchi throughout. Audible secretions noted. Hyoscyamine administered. BP hypotensive, HR tachycardic, radial and pedal pulses weak, mottling noted to BLE. Spoke with CNA at facility, patient has not been eating or drinking. Mouth care only. Discussed end of life symptoms with Emelina Castillo LPN and discussed use of PRN medications for patient comfort. No refills needed at this time. Call made to son, Celestino Bowen to discuss patient's imminent status. Questions answered and support offered. Encouraged facility staff and son to call with any questions, concerns, or changes in patient status.

## 2021-12-31 NOTE — HOSPICE
Received patient lying in bed unresponsive. Patient receiving daily nursing visits due to imminent status. She is no longer eating or drinking. Per CNA, 2 days ago patient was taking minimal sips/bites. Patient with cheyne alba respirations and labored breathing. 5mg morphine administered by facility nurse. Patient's respirations became less labored after medication, and she continued to rest peacefully. Periods of apnea continue for up to 30 seconds at a time. Her hands and feet with mottling. Bilateral feet cool to touch, with weak pedal and radial pulses. Assisted CNA with patient hygeine. Refills for morphine and ativan sent to [facility] for delivery to facility. Call placed to son to update on visit and patient's status. Son was able to visit with patient today. Questions answered and support provided. Encouraged son and facility staff to call with any questions, concerns, or changes in patient condition.

## 2022-01-01 ENCOUNTER — HOME CARE VISIT (OUTPATIENT)
Dept: SCHEDULING | Facility: HOME HEALTH | Age: 87
End: 2022-01-01
Payer: MEDICARE

## 2022-01-01 ENCOUNTER — HOME CARE VISIT (OUTPATIENT)
Dept: HOSPICE | Facility: HOSPICE | Age: 87
End: 2022-01-01
Payer: MEDICARE

## 2022-01-01 VITALS — RESPIRATION RATE: 6 BRPM | DIASTOLIC BLOOD PRESSURE: 46 MMHG | SYSTOLIC BLOOD PRESSURE: 82 MMHG | HEART RATE: 52 BPM

## 2022-01-01 VITALS — HEART RATE: 112 BPM | TEMPERATURE: 99 F | RESPIRATION RATE: 46 BRPM

## 2022-01-01 PROCEDURE — G0299 HHS/HOSPICE OF RN EA 15 MIN: HCPCS

## 2022-01-01 PROCEDURE — 0651 HSPC ROUTINE HOME CARE

## 2022-01-01 PROCEDURE — 3331090004 HSPC SERVICE INTENSITY ADD-ON

## 2022-01-01 NOTE — HOSPICE
Patient found lying in bed without signs or symptoms of pain or SOB. Patient is nonresponsive, hands cyanotic, breathing shallow with periods of apnea. Facility LPN Flakito Oneill reports patient has not required prn medications. Patient incontinent of urine. Care provided by this nurse, barrier cream applied. This nurse encourages the facility staff to contact [address] main number as needed with questions or concerns.

## 2022-01-02 NOTE — HOSPICE
Patient unresponsive. She appears very comfortable. Her lungs are clear bilat. Nailbeds are dusky. No peripheral pulses found. Unable to obtain pulse ox reading or blood pressure. Left foot has 2+ pitting edema. Absent bowel sounds. V.S. pulse 112; resp 46 irreg.; temp 99.0. Med tech/nurse at facility told of patient's rapid respirations and asked if a dose of morphine could be administered.

## 2022-01-02 NOTE — HOSPICE
Bertha Mar,  12/10/1920, had a lack of respirations and apical pulse for > one minute. Time of death 36. Son, Davis Leahy, called and informed. [facility] to  patient. Post mortem care done by LPN at facility. Meds to be wasted according to facility protocol. Joerns to be notified to  DME.

## 2022-01-03 ENCOUNTER — HOME CARE VISIT (OUTPATIENT)
Dept: HOSPICE | Facility: HOSPICE | Age: 87
End: 2022-01-03
Payer: MEDICARE

## 2022-01-04 ENCOUNTER — HOME CARE VISIT (OUTPATIENT)
Dept: HOSPICE | Facility: HOSPICE | Age: 87
End: 2022-01-04
Payer: MEDICARE